# Patient Record
Sex: MALE | Race: WHITE | HISPANIC OR LATINO | Employment: FULL TIME | URBAN - METROPOLITAN AREA
[De-identification: names, ages, dates, MRNs, and addresses within clinical notes are randomized per-mention and may not be internally consistent; named-entity substitution may affect disease eponyms.]

---

## 2019-08-08 ENCOUNTER — TELEPHONE (OUTPATIENT)
Dept: SCHEDULING | Facility: IMAGING CENTER | Age: 59
End: 2019-08-08

## 2019-08-13 ENCOUNTER — HOSPITAL ENCOUNTER (OUTPATIENT)
Facility: MEDICAL CENTER | Age: 59
End: 2019-08-13
Attending: FAMILY MEDICINE
Payer: COMMERCIAL

## 2019-08-13 ENCOUNTER — OFFICE VISIT (OUTPATIENT)
Dept: MEDICAL GROUP | Facility: PHYSICIAN GROUP | Age: 59
End: 2019-08-13
Payer: COMMERCIAL

## 2019-08-13 VITALS
TEMPERATURE: 97.7 F | OXYGEN SATURATION: 99 % | DIASTOLIC BLOOD PRESSURE: 80 MMHG | BODY MASS INDEX: 22.88 KG/M2 | HEART RATE: 70 BPM | SYSTOLIC BLOOD PRESSURE: 124 MMHG | HEIGHT: 70 IN | WEIGHT: 159.83 LBS

## 2019-08-13 DIAGNOSIS — F90.0 ATTENTION DEFICIT HYPERACTIVITY DISORDER (ADHD), PREDOMINANTLY INATTENTIVE TYPE: ICD-10-CM

## 2019-08-13 DIAGNOSIS — Z12.11 SCREENING FOR COLON CANCER: ICD-10-CM

## 2019-08-13 DIAGNOSIS — Z12.5 PROSTATE CANCER SCREENING: ICD-10-CM

## 2019-08-13 DIAGNOSIS — R79.89 LOW TESTOSTERONE IN MALE: ICD-10-CM

## 2019-08-13 LAB
AMPHET UR QL SCN: NEGATIVE
BARBITURATES UR QL SCN: NEGATIVE
BENZODIAZ UR QL SCN: NEGATIVE
BZE UR QL SCN: NEGATIVE
CANNABINOIDS UR QL SCN: NEGATIVE
METHADONE UR QL SCN: NEGATIVE
OPIATES UR QL SCN: NEGATIVE
OXYCODONE UR QL SCN: NEGATIVE
PCP UR QL SCN: NEGATIVE
PROPOXYPH UR QL SCN: NEGATIVE

## 2019-08-13 PROCEDURE — 80307 DRUG TEST PRSMV CHEM ANLYZR: CPT

## 2019-08-13 PROCEDURE — 99204 OFFICE O/P NEW MOD 45 MIN: CPT | Performed by: FAMILY MEDICINE

## 2019-08-13 RX ORDER — TESTOSTERONE CYPIONATE 200 MG/ML
200 INJECTION, SOLUTION INTRAMUSCULAR ONCE
Qty: 1 ML | Refills: 0 | Status: SHIPPED | OUTPATIENT
Start: 2019-08-13 | End: 2019-08-13

## 2019-08-13 RX ORDER — METHYLPHENIDATE HYDROCHLORIDE 10 MG/1
TABLET ORAL
Qty: 60 TAB | Refills: 2 | Status: SHIPPED | OUTPATIENT
Start: 2019-08-13 | End: 2019-09-12 | Stop reason: SDUPTHER

## 2019-08-13 ASSESSMENT — PATIENT HEALTH QUESTIONNAIRE - PHQ9: CLINICAL INTERPRETATION OF PHQ2 SCORE: 0

## 2019-08-13 NOTE — ASSESSMENT & PLAN NOTE
Diagnosed years ago, has been on Ritalin 10 mg bid, ran out past Fall. Having trouble concentrating at his work. Would like to resume med.

## 2019-08-13 NOTE — PROGRESS NOTES
Complaint: Establish care, needs meds     Subjective:     Shashank Farmer is a 59 y.o. male here today to establish care.     Attention deficit hyperactivity disorder (ADHD), predominantly inattentive type  Diagnosed years ago, has been on Ritalin 10 mg bid, ran out past Fall. Having trouble concentrating at his work. Would like to resume med.    Low testosterone in male  Used to be on Depo-testosterone 200 mg/month. Self-injects.     ,  in [place].    Current medicines (including changes today)  Current Outpatient Medications   Medication Sig Dispense Refill   • methylphenidate (RITALIN) 10 MG Tab Take one tab daily in AM and one in early afternoon daily. 60 Tab 2   • testosterone cypionate (DEPO-TESTOSTERONE) 200 MG/ML Solution injection 1 mL by Intramuscular route Once for 1 dose. 1 mL 0     No current facility-administered medications for this visit.      He  has a past medical history of ADHD, Allergy, and Low testosterone in male.    Health Maintenance: needs colon cancer screening.      Allergies: Patient has no allergy information on record.    Current Outpatient Medications Ordered in Epic   Medication Sig Dispense Refill   • methylphenidate (RITALIN) 10 MG Tab Take one tab daily in AM and one in early afternoon daily. 60 Tab 2   • testosterone cypionate (DEPO-TESTOSTERONE) 200 MG/ML Solution injection 1 mL by Intramuscular route Once for 1 dose. 1 mL 0     No current Epic-ordered facility-administered medications on file.        Past Medical History:   Diagnosis Date   • ADHD    • Allergy    • Low testosterone in male        Past Surgical History:   Procedure Laterality Date   • APPENDECTOMY         Social History     Tobacco Use   • Smoking status: Former Smoker     Packs/day: 0.50     Years: 2.00     Pack years: 1.00     Types: Cigarettes     Start date: 1974     Last attempt to quit: 1976     Years since quittin.0   • Smokeless tobacco: Never Used   Substance Use  Topics   • Alcohol use: Not Currently   • Drug use: Never       Social History     Social History Narrative   • Not on file       Family History   Problem Relation Age of Onset   • Stroke Mother    • Psychiatric Illness Brother         suicide         ROS Positive for decreased attention span, low libido.  Patient denies any fever, chills, unintentional weight gain/loss, fatigue, stroke symptoms, dizziness, headache, nasal congestion, sore-throat, cough, heartburn, chest pain, difficulty breathing, abdominal discomfort, diarrhea/constipation, burning with urination or frequency, joint or back pain, skin rashes, depression or anxiety.       Objective:     /80 (BP Location: Right arm, Patient Position: Sitting, BP Cuff Size: Adult)   Pulse 70   Temp 36.5 °C (97.7 °F) (Temporal)   Wt 72.5 kg (159 lb 13.3 oz)   SpO2 99%  There is no height or weight on file to calculate BMI.   Physical Exam:  Constitutional: Alert, no distress.  Skin: Warm, dry, good turgor, no rashes in visible areas.  Eye: Equal, round and reactive, conjunctiva clear, lids normal.  ENMT: Lips without lesions, dentition in good repair, oropharynx clear.  Neck: Trachea midline, no masses, no thyromegaly. No cervical or supraclavicular lymphadenopathy  Respiratory: Unlabored respiratory effort, lungs clear to auscultation, no wheezes, no ronchi.  Cardiovascular: Normal S1, S2, no murmur, no extremity edema.  Abdomen: Soft, non-tender, no masses, no hepatosplenomegaly.  Psych: Alert and oriented x3, appropriate affect and mood.        Assessment and Plan:   The following treatment plan was discussed    1. Attention deficit hyperactivity disorder (ADHD), predominantly inattentive type  - Controlled Substance Treatment Agreement  - methylphenidate (RITALIN) 10 MG Tab; Take one tab daily in AM and one in early afternoon daily.  Dispense: 60 Tab; Refill: 0  - URINE DRUG SCREEN; Future    2. Low testosterone in male  Will notify with lab test  results  - CBC WITH DIFFERENTIAL; Future  - Comp Metabolic Panel; Future  - Lipid Profile; Future  - TESTOSTERONE SERUM; Future  - testosterone cypionate (DEPO-TESTOSTERONE) 200 MG/ML Solution injection; 1 mL by Intramuscular route Once for 1 dose.  Dispense: 1 mL; Refill: 0    3. Prostate cancer screening  Will notify with result  - PROSTATE SPECIFIC AG SCREENING; Future    4. Screening for colon cancer  Will notify with result  - OCCULT BLOOD FECES IMMUNOASSAY (FIT); Future    Followup: Return in about 4 weeks (around 9/10/2019), or in SS.    Please note that this dictation was created using voice recognition software. I have made every reasonable attempt to correct obvious errors, but I expect that there are errors of grammar and possibly content that I did not discover before finalizing the note.

## 2019-08-15 ENCOUNTER — HOSPITAL ENCOUNTER (OUTPATIENT)
Facility: MEDICAL CENTER | Age: 59
End: 2019-08-15
Attending: FAMILY MEDICINE
Payer: COMMERCIAL

## 2019-08-15 ENCOUNTER — HOSPITAL ENCOUNTER (OUTPATIENT)
Dept: LAB | Facility: MEDICAL CENTER | Age: 59
End: 2019-08-15
Attending: FAMILY MEDICINE
Payer: COMMERCIAL

## 2019-08-15 DIAGNOSIS — Z12.5 PROSTATE CANCER SCREENING: ICD-10-CM

## 2019-08-15 DIAGNOSIS — R79.89 LOW TESTOSTERONE IN MALE: ICD-10-CM

## 2019-08-15 LAB
ALBUMIN SERPL BCP-MCNC: 4.4 G/DL (ref 3.2–4.9)
ALBUMIN/GLOB SERPL: 1.3 G/DL
ALP SERPL-CCNC: 66 U/L (ref 30–99)
ALT SERPL-CCNC: 26 U/L (ref 2–50)
ANION GAP SERPL CALC-SCNC: 7 MMOL/L (ref 0–11.9)
AST SERPL-CCNC: 27 U/L (ref 12–45)
BASOPHILS # BLD AUTO: 0.9 % (ref 0–1.8)
BASOPHILS # BLD: 0.05 K/UL (ref 0–0.12)
BILIRUB SERPL-MCNC: 0.7 MG/DL (ref 0.1–1.5)
BUN SERPL-MCNC: 17 MG/DL (ref 8–22)
CALCIUM SERPL-MCNC: 9.5 MG/DL (ref 8.5–10.5)
CHLORIDE SERPL-SCNC: 105 MMOL/L (ref 96–112)
CHOLEST SERPL-MCNC: 233 MG/DL (ref 100–199)
CO2 SERPL-SCNC: 27 MMOL/L (ref 20–33)
CREAT SERPL-MCNC: 1.1 MG/DL (ref 0.5–1.4)
EOSINOPHIL # BLD AUTO: 0.08 K/UL (ref 0–0.51)
EOSINOPHIL NFR BLD: 1.4 % (ref 0–6.9)
ERYTHROCYTE [DISTWIDTH] IN BLOOD BY AUTOMATED COUNT: 43.6 FL (ref 35.9–50)
FASTING STATUS PATIENT QL REPORTED: NORMAL
GLOBULIN SER CALC-MCNC: 3.3 G/DL (ref 1.9–3.5)
GLUCOSE SERPL-MCNC: 89 MG/DL (ref 65–99)
HCT VFR BLD AUTO: 42.9 % (ref 42–52)
HDLC SERPL-MCNC: 41 MG/DL
HGB BLD-MCNC: 14.5 G/DL (ref 14–18)
IMM GRANULOCYTES # BLD AUTO: 0.04 K/UL (ref 0–0.11)
IMM GRANULOCYTES NFR BLD AUTO: 0.7 % (ref 0–0.9)
LDLC SERPL CALC-MCNC: 138 MG/DL
LYMPHOCYTES # BLD AUTO: 1.63 K/UL (ref 1–4.8)
LYMPHOCYTES NFR BLD: 27.9 % (ref 22–41)
MCH RBC QN AUTO: 30.3 PG (ref 27–33)
MCHC RBC AUTO-ENTMCNC: 33.8 G/DL (ref 33.7–35.3)
MCV RBC AUTO: 89.6 FL (ref 81.4–97.8)
MONOCYTES # BLD AUTO: 0.57 K/UL (ref 0–0.85)
MONOCYTES NFR BLD AUTO: 9.7 % (ref 0–13.4)
NEUTROPHILS # BLD AUTO: 3.48 K/UL (ref 1.82–7.42)
NEUTROPHILS NFR BLD: 59.4 % (ref 44–72)
NRBC # BLD AUTO: 0 K/UL
NRBC BLD-RTO: 0 /100 WBC
PLATELET # BLD AUTO: 214 K/UL (ref 164–446)
PMV BLD AUTO: 10.6 FL (ref 9–12.9)
POTASSIUM SERPL-SCNC: 4.8 MMOL/L (ref 3.6–5.5)
PROT SERPL-MCNC: 7.7 G/DL (ref 6–8.2)
PSA SERPL-MCNC: 1.37 NG/ML (ref 0–4)
RBC # BLD AUTO: 4.79 M/UL (ref 4.7–6.1)
SODIUM SERPL-SCNC: 139 MMOL/L (ref 135–145)
TESTOST SERPL-MCNC: 326 NG/DL (ref 175–781)
TRIGL SERPL-MCNC: 272 MG/DL (ref 0–149)
WBC # BLD AUTO: 5.9 K/UL (ref 4.8–10.8)

## 2019-08-15 PROCEDURE — 82274 ASSAY TEST FOR BLOOD FECAL: CPT

## 2019-08-15 PROCEDURE — 80053 COMPREHEN METABOLIC PANEL: CPT

## 2019-08-15 PROCEDURE — 85025 COMPLETE CBC W/AUTO DIFF WBC: CPT

## 2019-08-15 PROCEDURE — 84403 ASSAY OF TOTAL TESTOSTERONE: CPT

## 2019-08-15 PROCEDURE — 84153 ASSAY OF PSA TOTAL: CPT

## 2019-08-15 PROCEDURE — 36415 COLL VENOUS BLD VENIPUNCTURE: CPT

## 2019-08-15 PROCEDURE — 80061 LIPID PANEL: CPT

## 2019-08-16 DIAGNOSIS — Z12.11 SCREENING FOR COLON CANCER: ICD-10-CM

## 2019-08-17 LAB — HEMOCCULT STL QL IA: NEGATIVE

## 2019-08-20 ENCOUNTER — TELEPHONE (OUTPATIENT)
Dept: MEDICAL GROUP | Facility: PHYSICIAN GROUP | Age: 59
End: 2019-08-20

## 2019-08-20 NOTE — TELEPHONE ENCOUNTER
MEDICATION PRIOR AUTHORIZATION NEEDED:    1. Name of Medication: METHYLPHENIDATE    2. KEY: [id number] thru Cover my Meds    3. Requested By (Name of Pharmacy): [facility]      4. Is insurance on file current? YES    5. What is the name & phone number of the 3rd party payor? NA

## 2019-08-26 NOTE — TELEPHONE ENCOUNTER
FINAL PRIOR AUTHORIZATION STATUS:    1.  Name of Medication & Dose:METHYLPHENIDATE     2. Prior Auth Status: Approved through 8-20-19 THRU 8-9-20      AUTH # [id number]    3. Action Taken: Pharmacy Notified: yes Patient Notified: no

## 2019-09-12 ENCOUNTER — OFFICE VISIT (OUTPATIENT)
Dept: MEDICAL GROUP | Facility: PHYSICIAN GROUP | Age: 59
End: 2019-09-12
Payer: COMMERCIAL

## 2019-09-12 VITALS
WEIGHT: 158.51 LBS | DIASTOLIC BLOOD PRESSURE: 60 MMHG | RESPIRATION RATE: 12 BRPM | OXYGEN SATURATION: 99 % | BODY MASS INDEX: 22.74 KG/M2 | HEART RATE: 78 BPM | SYSTOLIC BLOOD PRESSURE: 116 MMHG | TEMPERATURE: 97.8 F

## 2019-09-12 DIAGNOSIS — F90.0 ATTENTION DEFICIT HYPERACTIVITY DISORDER (ADHD), PREDOMINANTLY INATTENTIVE TYPE: ICD-10-CM

## 2019-09-12 DIAGNOSIS — E78.2 MIXED HYPERLIPIDEMIA: ICD-10-CM

## 2019-09-12 DIAGNOSIS — M79.641 PAIN OF RIGHT HAND: ICD-10-CM

## 2019-09-12 DIAGNOSIS — Z12.11 SCREENING FOR COLON CANCER: ICD-10-CM

## 2019-09-12 DIAGNOSIS — G89.29 CHRONIC PAIN OF LEFT KNEE: ICD-10-CM

## 2019-09-12 DIAGNOSIS — M25.562 CHRONIC PAIN OF LEFT KNEE: ICD-10-CM

## 2019-09-12 PROCEDURE — 99214 OFFICE O/P EST MOD 30 MIN: CPT | Performed by: FAMILY MEDICINE

## 2019-09-12 RX ORDER — METHYLPHENIDATE HYDROCHLORIDE 10 MG/1
10 TABLET ORAL 2 TIMES DAILY
Qty: 60 TAB | Refills: 0 | Status: SHIPPED | OUTPATIENT
Start: 2019-11-21 | End: 2019-12-12 | Stop reason: SDUPTHER

## 2019-09-12 RX ORDER — METHYLPHENIDATE HYDROCHLORIDE 10 MG/1
10 TABLET ORAL 2 TIMES DAILY
Qty: 60 TAB | Refills: 0 | Status: SHIPPED | OUTPATIENT
Start: 2019-09-21 | End: 2019-09-12 | Stop reason: SDUPTHER

## 2019-09-12 RX ORDER — METHYLPHENIDATE HYDROCHLORIDE 10 MG/1
10 TABLET ORAL 2 TIMES DAILY
Qty: 60 TAB | Refills: 0 | Status: SHIPPED | OUTPATIENT
Start: 2019-10-21 | End: 2019-09-12 | Stop reason: SDUPTHER

## 2019-09-12 NOTE — ASSESSMENT & PLAN NOTE
Recent FLP:  Results for JARED ALMARAZ (MRN [id number]) as of 9/12/2019 08:49   Ref. Range 8/15/2019 08:51   Cholesterol,Tot Latest Ref Range: 100 - 199 mg/dL 233 (H)   Triglycerides Latest Ref Range: 0 - 149 mg/dL 272 (H)   HDL Latest Ref Range: >=40 mg/dL 41   LDL Latest Ref Range: <100 mg/dL 138 (H)     Although 10 yr CV risk estimated at 16%, does not want to go on meds. Will take otc omega 3.

## 2019-09-12 NOTE — ASSESSMENT & PLAN NOTE
Tweaked left knee a couple weeks ago, now hurts and swells occasionally. Does not give way. No taking any pain meds.

## 2019-09-12 NOTE — PROGRESS NOTES
Complaint: f/u, review labs, referral, new ortho issues     Subjective:     Jared Almaraz is a 59 y.o. male here today for f/u    I reviewed lab test results with patient. CBC, CMP, PSA, testosterone levels wnl.    Mixed hyperlipidemia  Recent FLP:  Results for JARED ALMARAZ (MRN [id number]) as of 9/12/2019 08:49   Ref. Range 8/15/2019 08:51   Cholesterol,Tot Latest Ref Range: 100 - 199 mg/dL 233 (H)   Triglycerides Latest Ref Range: 0 - 149 mg/dL 272 (H)   HDL Latest Ref Range: >=40 mg/dL 41   LDL Latest Ref Range: <100 mg/dL 138 (H)     Although 10 yr CV risk estimated at 16%, does not want to go on meds. Will take otc omega 3.      Low testosterone in male  Recent testosterone level was 326, not needing intervention.    Attention deficit hyperactivity disorder (ADHD), predominantly inattentive type  Doing well on current dosage of ritalin. Needs refills. UDS positive for pot.    Chronic pain of left knee  Tweaked left knee a couple weeks ago, now hurts and swells occasionally. Does not give way. No taking any pain meds.    Pain of right hand  Has noticed painful bump on back of right hand, causes discomfort when he opens doors, grasps. Denies injury.     No other concerns or complaints today.    Current medicines (including changes today)  Current Outpatient Medications   Medication Sig Dispense Refill   • [START ON 11/21/2019] methylphenidate (RITALIN) 10 MG Tab Take 1 Tab by mouth 2 times a day for 30 days. Take one tab daily in AM and one in early afternoon daily. 60 Tab 0     No current facility-administered medications for this visit.      He  has a past medical history of ADHD, Allergy, and Low testosterone in male.    Health Maintenance: referral for colonoscopy      Allergies: Patient has no allergy information on record.    Current Outpatient Medications Ordered in Epic   Medication Sig Dispense Refill   • [START ON 11/21/2019] methylphenidate (RITALIN) 10 MG Tab Take 1 Tab by mouth 2 times a day  for 30 days. Take one tab daily in AM and one in early afternoon daily. 60 Tab 0     No current Epic-ordered facility-administered medications on file.        Past Medical History:   Diagnosis Date   • ADHD    • Allergy    • Low testosterone in male        Past Surgical History:   Procedure Laterality Date   • APPENDECTOMY         Social History     Tobacco Use   • Smoking status: Former Smoker     Packs/day: 0.50     Years: 2.00     Pack years: 1.00     Types: Cigarettes     Start date: 1974     Last attempt to quit: 1976     Years since quittin.1   • Smokeless tobacco: Never Used   Substance Use Topics   • Alcohol use: Not Currently   • Drug use: Never       Social History     Social History Narrative   • Not on file       Family History   Problem Relation Age of Onset   • Stroke Mother    • Psychiatric Illness Brother         suicide         ROS Positive for pain in left knee and right hand.  Patient denies any fever, chills, unintentional weight gain/loss, fatigue, stroke symptoms, dizziness, headache, nasal congestion, sore-throat, cough, heartburn, chest pain, difficulty breathing, abdominal discomfort, diarrhea/constipation, burning with urination or frequency, joint or back pain, skin rashes, depression or anxiety.       Objective:     /60   Pulse 78   Temp 36.6 °C (97.8 °F)   Resp 12   Wt 71.9 kg (158 lb 8.2 oz)   SpO2 99%  Body mass index is 22.74 kg/m².   Physical Exam:  Constitutional: Alert, no distress.  Right hand: bony prominence of base metacarpal 1, tender upon palpation.  Left knee: no deformity, no effusion, tender along emilee-medial shelf, some crepitus noticed upon flex/ext. No laxity noted.      Assessment and Plan:   The following treatment plan was discussed    1. Attention deficit hyperactivity disorder (ADHD), predominantly inattentive type  Controlled on meds.  - methylphenidate (RITALIN) 10 MG Tab; Take 1 Tab by mouth 2 times a day for 30 days. Take one tab  daily in AM and one in early afternoon daily.  Dispense: 60 Tab; Refill: 2 dated    2. Mixed hyperlipidemia  Recheck FLP 1 year.    3. Screening for colon cancer  - REFERRAL TO GI FOR COLONOSCOPY    4. Chronic pain of left knee  Observe, otc nsaids prn. May need XRs and referral if sxs persist or worsen.    5. Pain of right hand  Will notify with result, referral if needed.   - DX-HAND 3+ RIGHT; Future        Followup: Return in about 3 months (around 12/12/2019).    Please note that this dictation was created using voice recognition software. I have made every reasonable attempt to correct obvious errors, but I expect that there are errors of grammar and possibly content that I did not discover before finalizing the note.

## 2019-09-17 ENCOUNTER — APPOINTMENT (OUTPATIENT)
Dept: RADIOLOGY | Facility: IMAGING CENTER | Age: 59
End: 2019-09-17
Attending: FAMILY MEDICINE
Payer: COMMERCIAL

## 2019-09-17 ENCOUNTER — APPOINTMENT (OUTPATIENT)
Dept: URGENT CARE | Facility: PHYSICIAN GROUP | Age: 59
End: 2019-09-17
Payer: COMMERCIAL

## 2019-09-17 DIAGNOSIS — K52.9 CHRONIC DIARRHEA: ICD-10-CM

## 2019-09-17 DIAGNOSIS — M79.641 PAIN OF RIGHT HAND: ICD-10-CM

## 2019-09-17 PROCEDURE — 73130 X-RAY EXAM OF HAND: CPT | Mod: TC,FY,RT | Performed by: FAMILY MEDICINE

## 2019-09-18 ENCOUNTER — HOSPITAL ENCOUNTER (OUTPATIENT)
Facility: MEDICAL CENTER | Age: 59
End: 2019-09-18
Attending: FAMILY MEDICINE
Payer: COMMERCIAL

## 2019-09-18 DIAGNOSIS — K52.9 CHRONIC DIARRHEA: ICD-10-CM

## 2019-09-18 LAB
H PYLORI AG STL QL IA: DETECTED
WBC STL QL MICRO: NORMAL

## 2019-09-18 PROCEDURE — 87209 SMEAR COMPLEX STAIN: CPT

## 2019-09-18 PROCEDURE — 87177 OVA AND PARASITES SMEARS: CPT

## 2019-09-18 PROCEDURE — 87045 FECES CULTURE AEROBIC BACT: CPT

## 2019-09-18 PROCEDURE — 87899 AGENT NOS ASSAY W/OPTIC: CPT

## 2019-09-18 PROCEDURE — 87046 STOOL CULTR AEROBIC BACT EA: CPT

## 2019-09-18 PROCEDURE — 89055 LEUKOCYTE ASSESSMENT FECAL: CPT

## 2019-09-18 PROCEDURE — 87338 HPYLORI STOOL AG IA: CPT

## 2019-09-19 ENCOUNTER — TELEPHONE (OUTPATIENT)
Dept: MEDICAL GROUP | Facility: PHYSICIAN GROUP | Age: 59
End: 2019-09-19

## 2019-09-19 DIAGNOSIS — A04.8 H. PYLORI INFECTION: ICD-10-CM

## 2019-09-19 LAB
E COLI SXT1+2 STL IA: NORMAL
SIGNIFICANT IND 70042: NORMAL
SITE SITE: NORMAL
SOURCE SOURCE: NORMAL

## 2019-09-19 RX ORDER — OMEPRAZOLE 20 MG/1
20 CAPSULE, DELAYED RELEASE ORAL 2 TIMES DAILY
Qty: 60 CAP | Refills: 0 | Status: SHIPPED | OUTPATIENT
Start: 2019-09-19 | End: 2020-06-09

## 2019-09-19 RX ORDER — AMOXICILLIN 500 MG/1
1000 CAPSULE ORAL 2 TIMES DAILY
Qty: 56 CAP | Refills: 0 | Status: SHIPPED | OUTPATIENT
Start: 2019-09-19 | End: 2019-12-12

## 2019-09-19 RX ORDER — CLARITHROMYCIN 500 MG/1
500 TABLET, COATED ORAL 2 TIMES DAILY
Qty: 28 TAB | Refills: 0 | Status: SHIPPED | OUTPATIENT
Start: 2019-09-19 | End: 2019-10-03

## 2019-09-19 NOTE — TELEPHONE ENCOUNTER
Lab called to report positive H. Pylori results in Stool culture. Negative on WBC in stool culture.

## 2019-09-21 LAB
BACTERIA STL CULT: NORMAL
E COLI SXT1+2 STL IA: NORMAL
SIGNIFICANT IND 70042: NORMAL
SITE SITE: NORMAL
SOURCE SOURCE: NORMAL

## 2019-09-23 ENCOUNTER — TELEPHONE (OUTPATIENT)
Dept: URGENT CARE | Facility: PHYSICIAN GROUP | Age: 59
End: 2019-09-23

## 2019-09-23 ENCOUNTER — TELEPHONE (OUTPATIENT)
Dept: MEDICAL GROUP | Facility: PHYSICIAN GROUP | Age: 59
End: 2019-09-23

## 2019-09-23 NOTE — TELEPHONE ENCOUNTER
Lab called [facility] for a positive result:    Contains abnormal data H.PYLORI STOOL ANTIGEN   Order: [id number]   Status:  Final result   Visible to patient:  Yes (MyChart) Next appt:  None Dx:  Chronic diarrhea    Ref Range & Units 5d ago   H Pylori Ag Stool E Not Detected DETECTEDAbnormal     Resulting Agency  M      Narrative   Performed by: M   Patient weight (in lbs)->0  Has the patient been out of the country recently?->Yes  If yes, please indicate where, when, and duration.->[place]  Is the patient immuno-compromised?->No           Result forwarded to PCP/ ordering provider

## 2019-09-23 NOTE — TELEPHONE ENCOUNTER
[facility] called stating that they had received a positive test result for this patient.     They are requesting a call back at [phone] option 4

## 2019-09-24 LAB
O+P STL MICRO: NEGATIVE
O+P STL TRI STN: NEGATIVE

## 2019-10-28 ENCOUNTER — TELEPHONE (OUTPATIENT)
Dept: MEDICAL GROUP | Facility: CLINIC | Age: 59
End: 2019-10-28

## 2019-10-31 ENCOUNTER — OFFICE VISIT (OUTPATIENT)
Dept: MEDICAL GROUP | Facility: PHYSICIAN GROUP | Age: 59
End: 2019-10-31
Payer: COMMERCIAL

## 2019-10-31 ENCOUNTER — HOSPITAL ENCOUNTER (OUTPATIENT)
Dept: LAB | Facility: MEDICAL CENTER | Age: 59
End: 2019-10-31
Attending: FAMILY MEDICINE
Payer: COMMERCIAL

## 2019-10-31 VITALS
BODY MASS INDEX: 22.81 KG/M2 | OXYGEN SATURATION: 96 % | SYSTOLIC BLOOD PRESSURE: 108 MMHG | RESPIRATION RATE: 16 BRPM | DIASTOLIC BLOOD PRESSURE: 66 MMHG | HEART RATE: 84 BPM | WEIGHT: 158.95 LBS | TEMPERATURE: 98 F

## 2019-10-31 DIAGNOSIS — Z72.51 HIGH RISK HETEROSEXUAL BEHAVIOR: ICD-10-CM

## 2019-10-31 LAB
HCV AB SER QL: NEGATIVE
HIV 1+2 AB+HIV1 P24 AG SERPL QL IA: NON REACTIVE
TREPONEMA PALLIDUM IGG+IGM AB [PRESENCE] IN SERUM OR PLASMA BY IMMUNOASSAY: NON REACTIVE

## 2019-10-31 PROCEDURE — 36415 COLL VENOUS BLD VENIPUNCTURE: CPT

## 2019-10-31 PROCEDURE — 86780 TREPONEMA PALLIDUM: CPT

## 2019-10-31 PROCEDURE — 99213 OFFICE O/P EST LOW 20 MIN: CPT | Performed by: FAMILY MEDICINE

## 2019-10-31 PROCEDURE — 87389 HIV-1 AG W/HIV-1&-2 AB AG IA: CPT

## 2019-10-31 PROCEDURE — 86803 HEPATITIS C AB TEST: CPT

## 2019-10-31 PROCEDURE — 87491 CHLMYD TRACH DNA AMP PROBE: CPT

## 2019-10-31 PROCEDURE — 87591 N.GONORRHOEAE DNA AMP PROB: CPT

## 2019-11-01 LAB
C TRACH DNA SPEC QL NAA+PROBE: NEGATIVE
N GONORRHOEA DNA SPEC QL NAA+PROBE: NEGATIVE
SPECIMEN SOURCE: NORMAL

## 2019-11-01 NOTE — ASSESSMENT & PLAN NOTE
Would like to get tested for STDs. Had risky unprotected sex decades ago, but wife insisted he get tested now due to his H.pylori infection.

## 2019-11-01 NOTE — PROGRESS NOTES
Complaint: Wants testing done     Subjective:     Shashank Farmer is a 59 y.o. male here today for f/u of his chronic diarrhea treatment as well as new issue.    H. pylori infection  Completed treatment, no more sxs. Told patient to consider himself cured.    High risk heterosexual behavior  Would like to get tested for STDs. Had risky unprotected sex decades ago, but wife insisted he get tested now due to his H.pylori infection. No urinary sxs.     No other concerns or complaints today.    Current medicines (including changes today)  Current Outpatient Medications   Medication Sig Dispense Refill   • omeprazole (PRILOSEC) 20 MG delayed-release capsule Take 1 Cap by mouth 2 times a day. 60 Cap 0   • [START ON 11/21/2019] methylphenidate (RITALIN) 10 MG Tab Take 1 Tab by mouth 2 times a day for 30 days. Take one tab daily in AM and one in early afternoon daily. 60 Tab 0   • amoxicillin (AMOXIL) 500 MG Cap Take 2 Caps by mouth 2 times a day. 56 Cap 0     No current facility-administered medications for this visit.      He  has a past medical history of ADHD, Allergy, and Low testosterone in male.    Health Maintenance:       Allergies: Patient has no known allergies.    Current Outpatient Medications Ordered in Epic   Medication Sig Dispense Refill   • omeprazole (PRILOSEC) 20 MG delayed-release capsule Take 1 Cap by mouth 2 times a day. 60 Cap 0   • [START ON 11/21/2019] methylphenidate (RITALIN) 10 MG Tab Take 1 Tab by mouth 2 times a day for 30 days. Take one tab daily in AM and one in early afternoon daily. 60 Tab 0   • amoxicillin (AMOXIL) 500 MG Cap Take 2 Caps by mouth 2 times a day. 56 Cap 0     No current Epic-ordered facility-administered medications on file.        Past Medical History:   Diagnosis Date   • ADHD    • Allergy    • Low testosterone in male        Past Surgical History:   Procedure Laterality Date   • APPENDECTOMY         Social History     Tobacco Use   • Smoking status: Former Smoker      Packs/day: 0.50     Years: 2.00     Pack years: 1.00     Types: Cigarettes     Start date: 1974     Last attempt to quit: 1976     Years since quittin.2   • Smokeless tobacco: Never Used   Substance Use Topics   • Alcohol use: Not Currently   • Drug use: Never       Social History     Social History Narrative   • Not on file       Family History   Problem Relation Age of Onset   • Stroke Mother    • Psychiatric Illness Brother         suicide         ROS   Patient denies any fever, chills, unintentional weight gain/loss, fatigue, stroke symptoms, dizziness, headache, nasal congestion, sore-throat, cough, heartburn, chest pain, difficulty breathing, abdominal discomfort, diarrhea/constipation, burning with urination or frequency, joint or back pain, skin rashes, depression or anxiety.       Objective:     /66   Pulse 84   Temp 36.7 °C (98 °F)   Resp 16   Wt 72.1 kg (158 lb 15.2 oz)   SpO2 96%  Body mass index is 22.81 kg/m².   Physical Exam:  Constitutional: Alert, no distress.  No formal exam      Assessment and Plan:   The following treatment plan was discussed    1. High risk heterosexual behavior  Will notify with results.  - HEP C VIRUS ANTIBODY; Future  - HEPATITIS B SURFACE ANTIGEN; Future  - Chlamydia/GC PCR Urine Or Swab; Future  - HIV AG/AB COMBO ASSAY SCREENING; Future      Followup: Return for refills.    Please note that this dictation was created using voice recognition software. I have made every reasonable attempt to correct obvious errors, but I expect that there are errors of grammar and possibly content that I did not discover before finalizing the note.

## 2019-12-12 ENCOUNTER — OFFICE VISIT (OUTPATIENT)
Dept: MEDICAL GROUP | Facility: PHYSICIAN GROUP | Age: 59
End: 2019-12-12
Payer: COMMERCIAL

## 2019-12-12 VITALS
DIASTOLIC BLOOD PRESSURE: 82 MMHG | HEART RATE: 86 BPM | HEIGHT: 70 IN | TEMPERATURE: 98.7 F | BODY MASS INDEX: 22.09 KG/M2 | WEIGHT: 154.32 LBS | RESPIRATION RATE: 12 BRPM | OXYGEN SATURATION: 98 % | SYSTOLIC BLOOD PRESSURE: 122 MMHG

## 2019-12-12 DIAGNOSIS — F90.0 ATTENTION DEFICIT HYPERACTIVITY DISORDER (ADHD), PREDOMINANTLY INATTENTIVE TYPE: ICD-10-CM

## 2019-12-12 PROBLEM — Z72.51 HIGH RISK HETEROSEXUAL BEHAVIOR: Status: RESOLVED | Noted: 2019-10-31 | Resolved: 2019-12-12

## 2019-12-12 PROBLEM — A04.8 H. PYLORI INFECTION: Status: RESOLVED | Noted: 2019-09-19 | Resolved: 2019-12-12

## 2019-12-12 PROBLEM — M79.641 PAIN OF RIGHT HAND: Status: RESOLVED | Noted: 2019-09-12 | Resolved: 2019-12-12

## 2019-12-12 PROCEDURE — 99212 OFFICE O/P EST SF 10 MIN: CPT | Performed by: FAMILY MEDICINE

## 2019-12-12 RX ORDER — METHYLPHENIDATE HYDROCHLORIDE 10 MG/1
TABLET ORAL
Qty: 60 TAB | Refills: 0 | Status: SHIPPED | OUTPATIENT
Start: 2020-02-03 | End: 2019-12-12 | Stop reason: SDUPTHER

## 2019-12-12 RX ORDER — METHYLPHENIDATE HYDROCHLORIDE 10 MG/1
TABLET ORAL
Qty: 60 TAB | Refills: 0 | Status: SHIPPED | OUTPATIENT
Start: 2020-03-03 | End: 2019-12-12 | Stop reason: SDUPTHER

## 2019-12-12 RX ORDER — METHYLPHENIDATE HYDROCHLORIDE 10 MG/1
TABLET ORAL
Qty: 60 TAB | Refills: 0 | Status: SHIPPED | OUTPATIENT
Start: 2020-03-03 | End: 2020-03-31 | Stop reason: SDUPTHER

## 2019-12-12 RX ORDER — METHYLPHENIDATE HYDROCHLORIDE 10 MG/1
TABLET ORAL
Qty: 60 TAB | Refills: 0 | Status: SHIPPED | OUTPATIENT
Start: 2020-01-03 | End: 2019-12-12 | Stop reason: SDUPTHER

## 2019-12-12 NOTE — PROGRESS NOTES
Complaint: Med refill     Subjective:     Shashank Farmer is a 59 y.o. male here today for med refill.     Attention deficit hyperactivity disorder (ADHD), predominantly inattentive type  Doing well on his Ritalin. Due for refill beginning . No concerns.     No other concerns or complaints today.    Current medicines (including changes today)  Current Outpatient Medications   Medication Sig Dispense Refill   • [START ON 3/3/2020] methylphenidate (RITALIN) 10 MG Tab Take one tab daily in AM and one in early afternoon daily. 60 Tab 0   • omeprazole (PRILOSEC) 20 MG delayed-release capsule Take 1 Cap by mouth 2 times a day. 60 Cap 0     No current facility-administered medications for this visit.      He  has a past medical history of ADHD, Allergy, H. pylori infection, and Low testosterone in male.    Health Maintenance: declines vaccinations      Allergies: Patient has no known allergies.    Current Outpatient Medications Ordered in Epic   Medication Sig Dispense Refill   • [START ON 3/3/2020] methylphenidate (RITALIN) 10 MG Tab Take one tab daily in AM and one in early afternoon daily. 60 Tab 0   • omeprazole (PRILOSEC) 20 MG delayed-release capsule Take 1 Cap by mouth 2 times a day. 60 Cap 0     No current Epic-ordered facility-administered medications on file.        Past Medical History:   Diagnosis Date   • ADHD    • Allergy    • H. pylori infection    • Low testosterone in male        Past Surgical History:   Procedure Laterality Date   • APPENDECTOMY         Social History     Tobacco Use   • Smoking status: Former Smoker     Packs/day: 0.50     Years: 2.00     Pack years: 1.00     Types: Cigarettes     Start date: 1974     Last attempt to quit: 1976     Years since quittin.3   • Smokeless tobacco: Never Used   Substance Use Topics   • Alcohol use: Not Currently   • Drug use: Never       Social History     Patient does not qualify to have social determinant information on file (likely too  "young).   Social History Narrative   • Not on file       Family History   Problem Relation Age of Onset   • Stroke Mother    • Psychiatric Illness Brother         suicide         ROS   Patient denies any fever, chills, unintentional weight gain/loss, fatigue, stroke symptoms, dizziness, headache, nasal congestion, sore-throat, cough, heartburn, chest pain, difficulty breathing, abdominal discomfort, diarrhea/constipation, burning with urination or frequency, joint or back pain, skin rashes, depression or anxiety.       Objective:     /82   Pulse 86   Temp 37.1 °C (98.7 °F) (Temporal)   Resp 12   Ht 1.778 m (5' 10\")   Wt 70 kg (154 lb 5.2 oz)   SpO2 98%  Body mass index is 22.14 kg/m².   Physical Exam:  Constitutional: Alert, no distress.  Psych: Alert and oriented x3, appropriate affect and mood.        Assessment and Plan:   The following treatment plan was discussed    1. Attention deficit hyperactivity disorder (ADHD), predominantly inattentive type  Pedrito queried, due for refill 1/3/20.  - methylphenidate (RITALIN) 10 MG Tab; Take one tab daily in AM and one in early afternoon daily.  Dispense: 60 Tab; Refill: 2 dated      Followup: Return in about 3 months (around 3/12/2020), or if symptoms worsen or fail to improve.    Please note that this dictation was created using voice recognition software. I have made every reasonable attempt to correct obvious errors, but I expect that there are errors of grammar and possibly content that I did not discover before finalizing the note.           "

## 2020-03-31 ENCOUNTER — OFFICE VISIT (OUTPATIENT)
Dept: MEDICAL GROUP | Facility: PHYSICIAN GROUP | Age: 60
End: 2020-03-31
Payer: COMMERCIAL

## 2020-03-31 VITALS
OXYGEN SATURATION: 98 % | DIASTOLIC BLOOD PRESSURE: 80 MMHG | BODY MASS INDEX: 21.78 KG/M2 | TEMPERATURE: 98 F | HEART RATE: 76 BPM | SYSTOLIC BLOOD PRESSURE: 130 MMHG | WEIGHT: 152.12 LBS | RESPIRATION RATE: 16 BRPM | HEIGHT: 70 IN

## 2020-03-31 DIAGNOSIS — F90.0 ATTENTION DEFICIT HYPERACTIVITY DISORDER (ADHD), PREDOMINANTLY INATTENTIVE TYPE: ICD-10-CM

## 2020-03-31 DIAGNOSIS — R10.9 ABDOMINAL DISCOMFORT IN LEFT FLANK: ICD-10-CM

## 2020-03-31 DIAGNOSIS — Z01.812 PRE-PROCEDURE LAB EXAM: ICD-10-CM

## 2020-03-31 PROCEDURE — 99214 OFFICE O/P EST MOD 30 MIN: CPT | Performed by: FAMILY MEDICINE

## 2020-03-31 RX ORDER — METHYLPHENIDATE HYDROCHLORIDE 10 MG/1
TABLET ORAL
Qty: 60 TAB | Refills: 0 | Status: SHIPPED | OUTPATIENT
Start: 2020-06-02 | End: 2020-06-09 | Stop reason: SDUPTHER

## 2020-03-31 RX ORDER — METHYLPHENIDATE HYDROCHLORIDE 10 MG/1
TABLET ORAL
Qty: 60 TAB | Refills: 0 | Status: SHIPPED | OUTPATIENT
Start: 2020-05-02 | End: 2020-03-31 | Stop reason: SDUPTHER

## 2020-03-31 RX ORDER — METHYLPHENIDATE HYDROCHLORIDE 10 MG/1
TABLET ORAL
Qty: 60 TAB | Refills: 0 | Status: SHIPPED | OUTPATIENT
Start: 2020-04-02 | End: 2020-03-31 | Stop reason: SDUPTHER

## 2020-03-31 ASSESSMENT — FIBROSIS 4 INDEX: FIB4 SCORE: 1.53

## 2020-03-31 NOTE — PROGRESS NOTES
Complaint: refill Ritalin, tummy issues.     Subjective:     Shashank Farmer is a 60 y.o. male here today for f/u. Doing well. Working from home.    Attention deficit hyperactivity disorder (ADHD), predominantly inattentive type  Doing well on current dose of Ritalin, refill due in 2 days.    Abdominal discomfort in left flank  Complaining of more bloating, discomfort in abdomen, mainly down left flank. Regular soft BMs, no blood in stool. Not food related. No weight loss. Las colonoscopy 5 years ago, wnl.     No other concerns or complaints today.    Current medicines (including changes today)  Current Outpatient Medications   Medication Sig Dispense Refill   • [START ON 6/2/2020] methylphenidate (RITALIN) 10 MG Tab Take one tab daily in AM and one in early afternoon daily. 60 Tab 0   • omeprazole (PRILOSEC) 20 MG delayed-release capsule Take 1 Cap by mouth 2 times a day. (Patient not taking: Reported on 3/31/2020) 60 Cap 0     No current facility-administered medications for this visit.      He  has a past medical history of ADHD, Allergy, H. pylori infection, and Low testosterone in male.    Health Maintenance:       Allergies: Patient has no known allergies.    Current Outpatient Medications Ordered in Epic   Medication Sig Dispense Refill   • [START ON 6/2/2020] methylphenidate (RITALIN) 10 MG Tab Take one tab daily in AM and one in early afternoon daily. 60 Tab 0   • omeprazole (PRILOSEC) 20 MG delayed-release capsule Take 1 Cap by mouth 2 times a day. (Patient not taking: Reported on 3/31/2020) 60 Cap 0     No current Epic-ordered facility-administered medications on file.        Past Medical History:   Diagnosis Date   • ADHD    • Allergy    • H. pylori infection    • Low testosterone in male        Past Surgical History:   Procedure Laterality Date   • APPENDECTOMY         Social History     Tobacco Use   • Smoking status: Former Smoker     Packs/day: 0.50     Years: 2.00     Pack years: 1.00     Types:  "Cigarettes     Start date: 1974     Last attempt to quit: 1976     Years since quittin.6   • Smokeless tobacco: Never Used   Substance Use Topics   • Alcohol use: Not Currently   • Drug use: Never       Social History     Social History Narrative   • Not on file       Family History   Problem Relation Age of Onset   • Stroke Mother    • Psychiatric Illness Brother         suicide         ROS Positive for abdominal discomfort, bloating.  Patient denies any fever, chills, unintentional weight gain/loss, fatigue, stroke symptoms, dizziness, headache, nasal congestion, sore-throat, cough, heartburn, chest pain, difficulty breathing, diarrhea/constipation, burning with urination or frequency, joint or back pain, skin rashes, depression or anxiety.       Objective:     /80 (BP Location: Left arm, Patient Position: Sitting, BP Cuff Size: Adult)   Pulse 76   Temp 36.7 °C (98 °F) (Temporal)   Resp 16   Ht 1.778 m (5' 10\")   Wt 69 kg (152 lb 1.9 oz)   SpO2 98%  Body mass index is 21.83 kg/m².   Physical Exam:  Constitutional: Alert, no distress.  Respiratory: Unlabored respiratory effort, lungs clear to auscultation, no wheezes, no ronchi.  Cardiovascular: Normal S1, S2, no murmur, no extremity edema.  Abdomen: Soft, non-tender, no masses, no hepatosplenomegaly.  Psych: Alert and oriented x3, appropriate affect and mood.        Assessment and Plan:   The following treatment plan was discussed    1. Attention deficit hyperactivity disorder (ADHD), predominantly inattentive type  Controlled on meds.  - methylphenidate (RITALIN) 10 MG Tab; Take one tab daily in AM and one in early afternoon daily.  Dispense: 60 Tab; Refill: 2 dated    2. Abdominal discomfort in left flank  Probably diverticular disease. Will notify with result. Patient to call to set up.   - CT-ABDOMEN WITH & W/O, PELVIS WITH; Future    3. Pre-procedure lab exam  - Comp Metabolic Panel; Future      Followup: Return in about 3 months " (around 6/30/2020) for refill Ritalin.    Please note that this dictation was created using voice recognition software. I have made every reasonable attempt to correct obvious errors, but I expect that there are errors of grammar and possibly content that I did not discover before finalizing the note.

## 2020-03-31 NOTE — ASSESSMENT & PLAN NOTE
Complaining of more bloating, discomfort in abdomen, mainly down left flank. Regular soft BMs, no blood in stool. Not food related. No weight loss. Las colonoscopy 5 years ago, r[place].

## 2020-04-01 ENCOUNTER — HOSPITAL ENCOUNTER (OUTPATIENT)
Dept: LAB | Facility: MEDICAL CENTER | Age: 60
End: 2020-04-01
Attending: FAMILY MEDICINE
Payer: COMMERCIAL

## 2020-04-01 DIAGNOSIS — Z01.812 PRE-PROCEDURE LAB EXAM: ICD-10-CM

## 2020-04-01 LAB
ALBUMIN SERPL BCP-MCNC: 4.4 G/DL (ref 3.2–4.9)
ALBUMIN/GLOB SERPL: 1.5 G/DL
ALP SERPL-CCNC: 79 U/L (ref 30–99)
ALT SERPL-CCNC: 24 U/L (ref 2–50)
ANION GAP SERPL CALC-SCNC: 14 MMOL/L (ref 7–16)
AST SERPL-CCNC: 28 U/L (ref 12–45)
BILIRUB SERPL-MCNC: 0.5 MG/DL (ref 0.1–1.5)
BUN SERPL-MCNC: 16 MG/DL (ref 8–22)
CALCIUM SERPL-MCNC: 9.1 MG/DL (ref 8.5–10.5)
CHLORIDE SERPL-SCNC: 104 MMOL/L (ref 96–112)
CO2 SERPL-SCNC: 23 MMOL/L (ref 20–33)
CREAT SERPL-MCNC: 1.04 MG/DL (ref 0.5–1.4)
GLOBULIN SER CALC-MCNC: 2.9 G/DL (ref 1.9–3.5)
GLUCOSE SERPL-MCNC: 160 MG/DL (ref 65–99)
POTASSIUM SERPL-SCNC: 3.9 MMOL/L (ref 3.6–5.5)
PROT SERPL-MCNC: 7.3 G/DL (ref 6–8.2)
SODIUM SERPL-SCNC: 141 MMOL/L (ref 135–145)

## 2020-04-01 PROCEDURE — 36415 COLL VENOUS BLD VENIPUNCTURE: CPT

## 2020-04-01 PROCEDURE — 80053 COMPREHEN METABOLIC PANEL: CPT

## 2020-04-08 ENCOUNTER — HOSPITAL ENCOUNTER (OUTPATIENT)
Dept: RADIOLOGY | Facility: MEDICAL CENTER | Age: 60
End: 2020-04-08
Attending: FAMILY MEDICINE
Payer: COMMERCIAL

## 2020-04-08 ENCOUNTER — TELEPHONE (OUTPATIENT)
Dept: MEDICAL GROUP | Facility: PHYSICIAN GROUP | Age: 60
End: 2020-04-08

## 2020-04-08 DIAGNOSIS — R10.9 ABDOMINAL DISCOMFORT IN LEFT FLANK: ICD-10-CM

## 2020-04-08 PROCEDURE — 700117 HCHG RX CONTRAST REV CODE 255: Performed by: FAMILY MEDICINE

## 2020-04-08 PROCEDURE — 74177 CT ABD & PELVIS W/CONTRAST: CPT

## 2020-04-08 RX ADMIN — IOHEXOL 25 ML: 240 INJECTION, SOLUTION INTRATHECAL; INTRAVASCULAR; INTRAVENOUS; ORAL at 10:47

## 2020-04-08 RX ADMIN — IOHEXOL 100 ML: 350 INJECTION, SOLUTION INTRAVENOUS at 10:47

## 2020-04-08 NOTE — TELEPHONE ENCOUNTER
Freda from [facility] on [address] called to clarify order for the CT Abdomen. She asked what are we looking for or ruling out I informed in the notes it states for probably Diverticular disease. She stated that without contrast isn't needed then that only with is ok. She needs clarification if ok to do just with contrast? Her ext. [id number] and patient's appt. Is at 10:30am this morning

## 2020-06-09 ENCOUNTER — OFFICE VISIT (OUTPATIENT)
Dept: MEDICAL GROUP | Facility: PHYSICIAN GROUP | Age: 60
End: 2020-06-09
Payer: COMMERCIAL

## 2020-06-09 VITALS
TEMPERATURE: 97 F | SYSTOLIC BLOOD PRESSURE: 118 MMHG | HEART RATE: 65 BPM | OXYGEN SATURATION: 99 % | RESPIRATION RATE: 16 BRPM | WEIGHT: 153.66 LBS | DIASTOLIC BLOOD PRESSURE: 76 MMHG | BODY MASS INDEX: 21.51 KG/M2 | HEIGHT: 71 IN

## 2020-06-09 DIAGNOSIS — R10.9 ABDOMINAL DISCOMFORT IN LEFT FLANK: ICD-10-CM

## 2020-06-09 DIAGNOSIS — F90.0 ATTENTION DEFICIT HYPERACTIVITY DISORDER (ADHD), PREDOMINANTLY INATTENTIVE TYPE: ICD-10-CM

## 2020-06-09 DIAGNOSIS — R14.0 BLOATING: ICD-10-CM

## 2020-06-09 PROBLEM — Z01.812 PRE-PROCEDURAL LABORATORY EXAMINATION: Status: RESOLVED | Noted: 2020-03-31 | Resolved: 2020-06-09

## 2020-06-09 PROCEDURE — 99214 OFFICE O/P EST MOD 30 MIN: CPT | Performed by: FAMILY MEDICINE

## 2020-06-09 RX ORDER — METHYLPHENIDATE HYDROCHLORIDE 10 MG/1
TABLET ORAL
Qty: 60 TAB | Refills: 0 | Status: SHIPPED | OUTPATIENT
Start: 2020-06-12 | End: 2020-06-09 | Stop reason: SDUPTHER

## 2020-06-09 RX ORDER — METHYLPHENIDATE HYDROCHLORIDE 10 MG/1
TABLET ORAL
Qty: 60 TAB | Refills: 0 | Status: SHIPPED | OUTPATIENT
Start: 2020-08-11 | End: 2020-09-10

## 2020-06-09 RX ORDER — METHYLPHENIDATE HYDROCHLORIDE 10 MG/1
TABLET ORAL
Qty: 60 TAB | Refills: 0 | Status: SHIPPED | OUTPATIENT
Start: 2020-07-11 | End: 2020-06-09 | Stop reason: SDUPTHER

## 2020-06-09 ASSESSMENT — FIBROSIS 4 INDEX: FIB4 SCORE: 1.66

## 2020-06-09 ASSESSMENT — PATIENT HEALTH QUESTIONNAIRE - PHQ9: CLINICAL INTERPRETATION OF PHQ2 SCORE: 0

## 2020-06-09 NOTE — ASSESSMENT & PLAN NOTE
Having bloating, worse after certain foods (ice cream), worse at night to point of having to get up. Ongoing for couple months. Has supposely clean colonoscopy 6-8 years ago. No change in BMs.

## 2020-06-09 NOTE — PROGRESS NOTES
Complaint: Bloating     Subjective:     Shashank Farmer is a 60 y.o. male here today for f/u. New problems.    Bloating  Having bloating, worse after certain foods (ice cream), worse at night to point of having to get up. Ongoing for couple months. Has supposely clean colonoscopy 6-8 years ago. No change in BMs.     Abdominal discomfort in left flank  Crampy pain in left lower abdomen. No change in BMs.    Attention deficit hyperactivity disorder (ADHD), predominantly inattentive type  Doing well on Ritalin. Needs refill x 3 months. [employer] queried.     No other concerns or complaints.    Current medicines (including changes today)  Current Outpatient Medications   Medication Sig Dispense Refill   • [START ON 2020] methylphenidate (RITALIN) 10 MG Tab Take one tab daily in AM and one in early afternoon daily. 60 Tab 0     No current facility-administered medications for this visit.      He  has a past medical history of ADHD, Allergy, H. pylori infection, and Low testosterone in male.    Health Maintenance:       Allergies: Patient has no known allergies.    Current Outpatient Medications Ordered in Epic   Medication Sig Dispense Refill   • [START ON 2020] methylphenidate (RITALIN) 10 MG Tab Take one tab daily in AM and one in early afternoon daily. 60 Tab 0     No current Epic-ordered facility-administered medications on file.        Past Medical History:   Diagnosis Date   • ADHD    • Allergy    • H. pylori infection    • Low testosterone in male        Past Surgical History:   Procedure Laterality Date   • APPENDECTOMY         Social History     Tobacco Use   • Smoking status: Former Smoker     Packs/day: 0.50     Years: 2.00     Pack years: 1.00     Types: Cigarettes     Start date: 1974     Last attempt to quit: 1976     Years since quittin.8   • Smokeless tobacco: Never Used   Substance Use Topics   • Alcohol use: Not Currently   • Drug use: Never       Social History     Social  "History Narrative   • Not on file       Family History   Problem Relation Age of Onset   • Stroke Mother    • Psychiatric Illness Brother         suicide         ROS Positive for bloating, LLQ discomfort  Patient denies any fever, chills, unintentional weight gain/loss, fatigue, stroke symptoms, dizziness, headache, nasal congestion, sore-throat, cough, heartburn, chest pain, difficulty breathing,  diarrhea/constipation, burning with urination or frequency, joint or back pain, skin rashes, depression or anxiety.       Objective:     /76 (BP Location: Left arm, Patient Position: Sitting, BP Cuff Size: Adult)   Pulse 65   Temp 36.1 °C (97 °F) (Temporal)   Resp 16   Ht 1.803 m (5' 11\")   Wt 69.7 kg (153 lb 10.6 oz)   SpO2 99%  Body mass index is 21.43 kg/m².   Physical Exam:  Constitutional: Alert, no distress.  Respiratory: Unlabored respiratory effort, lungs clear to auscultation, no wheezes, no ronchi.  Cardiovascular: Normal S1, S2, no murmur, no extremity edema.  Abdomen: Soft, non-tender, no masses, no hepatosplenomegaly.  Psych: Alert and oriented x3, appropriate affect and mood.        Assessment and Plan:   The following treatment plan was discussed    1. Bloating  Probably nutritional, recommend he observe which foods cause sxs, eliminate them from diet.  - REFERRAL TO GASTROENTEROLOGY    2. Abdominal discomfort in left flank  - REFERRAL TO GASTROENTEROLOGY    3. Attention deficit hyperactivity disorder (ADHD), predominantly inattentive type  Controlled on med.  - methylphenidate (RITALIN) 10 MG Tab; Take one tab daily in AM and one in early afternoon daily.  Dispense: 60 Tab; Refill: 2 dated      Followup: Return in about 3 months (around 9/9/2020) for to establish with new pcp.    Please note that this dictation was created using voice recognition software. I have made every reasonable attempt to correct obvious errors, but I expect that there are errors of grammar and possibly content that I " did not discover before finalizing the note.

## 2020-06-12 ENCOUNTER — PATIENT MESSAGE (OUTPATIENT)
Dept: HEALTH INFORMATION MANAGEMENT | Facility: OTHER | Age: 60
End: 2020-06-12

## 2020-06-15 ENCOUNTER — PATIENT MESSAGE (OUTPATIENT)
Dept: HEALTH INFORMATION MANAGEMENT | Facility: OTHER | Age: 60
End: 2020-06-15

## 2020-06-30 ENCOUNTER — TELEMEDICINE ORIGINATING SITE VISIT (OUTPATIENT)
Dept: MEDICAL GROUP | Facility: PHYSICIAN GROUP | Age: 60
End: 2020-06-30
Payer: COMMERCIAL

## 2020-06-30 ENCOUNTER — APPOINTMENT (OUTPATIENT)
Dept: SCHEDULING | Facility: IMAGING CENTER | Age: 60
End: 2020-06-30
Payer: COMMERCIAL

## 2021-08-10 ENCOUNTER — OFFICE VISIT (OUTPATIENT)
Dept: MEDICAL GROUP | Facility: PHYSICIAN GROUP | Age: 61
End: 2021-08-10
Payer: COMMERCIAL

## 2021-08-10 ENCOUNTER — HOSPITAL ENCOUNTER (OUTPATIENT)
Facility: MEDICAL CENTER | Age: 61
End: 2021-08-10
Attending: NURSE PRACTITIONER
Payer: COMMERCIAL

## 2021-08-10 VITALS
OXYGEN SATURATION: 98 % | TEMPERATURE: 97.5 F | DIASTOLIC BLOOD PRESSURE: 72 MMHG | HEART RATE: 65 BPM | RESPIRATION RATE: 16 BRPM | SYSTOLIC BLOOD PRESSURE: 122 MMHG | BODY MASS INDEX: 22.09 KG/M2 | WEIGHT: 157.8 LBS | HEIGHT: 71 IN

## 2021-08-10 DIAGNOSIS — G89.29 CHRONIC PAIN OF LEFT KNEE: ICD-10-CM

## 2021-08-10 DIAGNOSIS — M25.522 LEFT ELBOW PAIN: ICD-10-CM

## 2021-08-10 DIAGNOSIS — F90.0 ATTENTION DEFICIT HYPERACTIVITY DISORDER (ADHD), PREDOMINANTLY INATTENTIVE TYPE: ICD-10-CM

## 2021-08-10 DIAGNOSIS — R79.89 LOW TESTOSTERONE IN MALE: ICD-10-CM

## 2021-08-10 DIAGNOSIS — Z00.00 PHYSICAL EXAM, ANNUAL: ICD-10-CM

## 2021-08-10 DIAGNOSIS — E78.2 MIXED HYPERLIPIDEMIA: ICD-10-CM

## 2021-08-10 DIAGNOSIS — M25.562 CHRONIC PAIN OF LEFT KNEE: ICD-10-CM

## 2021-08-10 PROBLEM — R10.9 ABDOMINAL DISCOMFORT IN LEFT FLANK: Status: RESOLVED | Noted: 2020-03-31 | Resolved: 2021-08-10

## 2021-08-10 PROBLEM — R14.0 BLOATING: Status: RESOLVED | Noted: 2020-06-09 | Resolved: 2021-08-10

## 2021-08-10 PROCEDURE — 99214 OFFICE O/P EST MOD 30 MIN: CPT | Performed by: NURSE PRACTITIONER

## 2021-08-10 PROCEDURE — 80307 DRUG TEST PRSMV CHEM ANLYZR: CPT

## 2021-08-10 RX ORDER — LATANOPROST 50 UG/ML
SOLUTION/ DROPS OPHTHALMIC
COMMUNITY
Start: 2021-08-02 | End: 2022-03-09

## 2021-08-10 RX ORDER — METHYLPHENIDATE HYDROCHLORIDE 10 MG/1
10 TABLET ORAL 2 TIMES DAILY
Qty: 60 TABLET | Refills: 0 | Status: SHIPPED | OUTPATIENT
Start: 2021-08-10 | End: 2021-09-09

## 2021-08-10 ASSESSMENT — PATIENT HEALTH QUESTIONNAIRE - PHQ9: CLINICAL INTERPRETATION OF PHQ2 SCORE: 0

## 2021-08-10 ASSESSMENT — ENCOUNTER SYMPTOMS
CHILLS: 0
BLOOD IN STOOL: 0
DIZZINESS: 0
WEIGHT LOSS: 0
HEADACHES: 0
VOMITING: 0
COUGH: 0
PALPITATIONS: 0
SHORTNESS OF BREATH: 0
FEVER: 0
NAUSEA: 0
WEAKNESS: 0
DEPRESSION: 0
ABDOMINAL PAIN: 0
EYES NEGATIVE: 1

## 2021-08-10 ASSESSMENT — FIBROSIS 4 INDEX: FIB4 SCORE: 1.68

## 2021-08-10 NOTE — ASSESSMENT & PLAN NOTE
Not taking any medications for this  With last provider was going to work on lifestyle modifications  Will get new labs

## 2021-08-10 NOTE — ASSESSMENT & PLAN NOTE
Chronic and stable condition  States he was previously on Testosterone injections about 18 months ago  Had labs drawn in 2019 where his labs were normal at 326.   Requesting new labs  C/O some fatigue

## 2021-08-10 NOTE — ASSESSMENT & PLAN NOTE
Chronic and stable condition   Used to be very active with running marathons  Occasional discomfort  Has not tired any ice or heat therapy   Not currently taking any medications for this

## 2021-08-10 NOTE — PROGRESS NOTES
Chief Complaint   Patient presents with   • Establish Care   • Ankle Pain     x 6mons      Subjective:     Shashank Farmer is a 61 y.o. male presenting for establish care and management of      Mixed hyperlipidemia  Not taking any medications for this  With last provider was going to work on lifestyle modifications  Will get new labs    Low testosterone in male  Chronic and stable condition  States he was previously on Testosterone injections about 18 months ago  Had labs drawn in 2019 where his labs were normal at 326.   Requesting new labs  C/O some fatigue    Attention deficit hyperactivity disorder (ADHD), predominantly inattentive type  Chronic and stable condition   Was diagnosed back in 1999  Was prescribed Ritalin 10 mg twice daily one in the morning and one in the afternoon  Last prescription was with his past provider who subsequently retired and he never followed up to get another provider   Requesting refills today      Chronic pain of left knee  Chronic and stable condition   Used to be very active with running marathons  Occasional discomfort  Has not tired any ice or heat therapy   Not currently taking any medications for this      Left elbow pain  Chronic and stable condition   Noted at one point for 6 months   Occasional residual pain from elbow down to mid-forearm  States at time is swells at the elbow  Denies using any treatments for this  Denies chills, fever, heat, redness, numbness or tingling.         Review of Systems   Constitutional: Negative for chills, fever and weight loss.   HENT: Negative.    Eyes: Negative.    Respiratory: Negative for cough and shortness of breath.    Cardiovascular: Negative for chest pain, palpitations and leg swelling.   Gastrointestinal: Negative for abdominal pain, blood in stool, nausea and vomiting.   Genitourinary: Negative for dysuria and hematuria.   Musculoskeletal: Positive for joint pain.        Left knee pain intermittently with activity   Skin:  "Negative.    Neurological: Negative for dizziness, weakness and headaches.   Psychiatric/Behavioral: Negative for depression and suicidal ideas.            Current Outpatient Medications:   •  latanoprost (XALATAN) 0.005 % Solution, INSTILL 1 DROP INTO EACH EYE AT BEDTIME AS DIRECTED, Disp: , Rfl:   •  methylphenidate (RITALIN) 10 MG Tab, Take 1 tablet by mouth 2 times a day for 30 days., Disp: 60 tablet, Rfl: 0    Past Medical History:   Diagnosis Date   • Abdominal discomfort in left flank 3/31/2020   • ADHD    • Allergy    • Bloating 6/9/2020   • H. pylori infection    • Low testosterone in male        Past Surgical History:   Procedure Laterality Date   • APPENDECTOMY         Family History   Problem Relation Age of Onset   • Stroke Mother    • Psychiatric Illness Brother         suicide       Patient has no known allergies.    Allergies, past medical history, past surgical history, family history, social history reviewed and updated    Objective:     Vitals: /72   Pulse 65   Temp 36.4 °C (97.5 °F) (Temporal)   Resp 16   Ht 1.791 m (5' 10.5\")   Wt 71.6 kg (157 lb 12.8 oz)   SpO2 98%   BMI 22.32 kg/m²   General: Alert, pleasant, NAD  EYES:   PERRL, EOMI, no icterus or pallor.  Conjunctivae and lids normal.   HENT:  Normocephalic.  External ears normal. Tympanic membranes pearly, opaque.  No nasal drainage present.  Oropharynx non-erythematous, mucous membranes moist.  Neck supple.   No thyromegaly or masses palpated. No cervical or supraclavicular lymphadenopathy.  Heart: Regular rate and rhythm.  S1 and S2 normal.  No murmurs appreciated.  Respiratory: Normal respiratory effort.  Clear to auscultation bilaterally.  Abdomen: Non-distended, soft, non-tender, no guarding/rebound. Bowel sounds present.  No hepatosplenomegaly.  No hernias.  Skin: Warm, dry, no rashes.  Musculoskeletal: Gait is normal.  Moves all extremities well.    Extremities: No clubbing, cyanosis or edema noted.   Neurological: No " tremors, sensation grossly intact, tone/strength normal, gait is normal, CN2-12 intact.  Psych:  Affect/mood is normal, judgement is good, memory is intact, grooming is appropriate.    Assessment/Plan:     1. Physical exam, annual  - CBC WITH DIFFERENTIAL; Future  - Comp Metabolic Panel; Future  - Lipid Profile; Future  - TESTOSTERONE, FREE AND TOTAL; Future    2. Low testosterone in male  - TESTOSTERONE, FREE AND TOTAL; Future    3. Mixed hyperlipidemia  - Lipid Profile; Future    4. Left elbow pain  Will continue to monitor     5. Attention deficit hyperactivity disorder (ADHD), predominantly inattentive type  - methylphenidate (RITALIN) 10 MG Tab; Take 1 tablet by mouth 2 times a day for 30 days.  Dispense: 60 tablet; Refill: 0  - Controlled Substance Treatment Agreement  - URINE DRUG SCREEN    6. Chronic pain of left knee  Will continue to monitor    Discussed with patient possible alternative diagnoses, patient is to take all medications as prescribed.     If symptoms persist FU w/PCP, if symptoms worsen go to emergency room.     If experiencing any side effects from prescribed medications reports to the office immediately or go to emergency room.    Reviewed indication, dosage, usage and potential adverse effects of prescribed medications.     Reviewed risks and benefits of treatment plan. Patient verbalizes understanding of all instruction and verbally agrees to plan.    Discussed plan with the patient, and she agrees to the above.      I personally reviewed prior external notes and test results pertinent to today's visit.        Return in about 4 weeks (around 9/7/2021).    My total time spent caring for the patient on the day of the encounter was 30 minutes.   This does not include time spent on separately billable procedures/tests.     Please note that this dictation was created using voice recognition software. I have made every reasonable attempt to correct obvious errors, but I expect that there may be  errors of grammar and possibly content that I did not discover before finalizing the note.

## 2021-08-10 NOTE — ASSESSMENT & PLAN NOTE
Chronic and stable condition   Noted at one point for 6 months   Occasional residual pain from elbow down to mid-forearm  States at time is swells at the elbow  Denies using any treatments for this  Denies chills, fever, heat, redness, numbness or tingling.

## 2021-08-10 NOTE — ASSESSMENT & PLAN NOTE
Chronic and stable condition   Was diagnosed back in 1999  Was prescribed Ritalin 10 mg twice daily one in the morning and one in the afternoon  Last prescription was with his past provider who subsequently retired and he never followed up to get another provider   Requesting refills today

## 2021-08-10 NOTE — PATIENT INSTRUCTIONS
1. Get labs completed prior to our next visit.  These will be fasting labs, do not eat or drink 8 to 10 hours prior to your labs.  Make sure that you drink lots of water.  We will discuss the results of these at our next appointment. Please get these done ELLI AM for the testosterone     2.. Prescriptions  sent to [facility] in [place].

## 2021-08-11 ENCOUNTER — HOSPITAL ENCOUNTER (OUTPATIENT)
Dept: LAB | Facility: MEDICAL CENTER | Age: 61
End: 2021-08-11
Attending: NURSE PRACTITIONER
Payer: COMMERCIAL

## 2021-08-11 DIAGNOSIS — Z00.00 PHYSICAL EXAM, ANNUAL: ICD-10-CM

## 2021-08-11 DIAGNOSIS — R79.89 LOW TESTOSTERONE IN MALE: ICD-10-CM

## 2021-08-11 DIAGNOSIS — E78.2 MIXED HYPERLIPIDEMIA: ICD-10-CM

## 2021-08-11 LAB
ALBUMIN SERPL BCP-MCNC: 4.2 G/DL (ref 3.2–4.9)
ALBUMIN/GLOB SERPL: 1.5 G/DL
ALP SERPL-CCNC: 78 U/L (ref 30–99)
ALT SERPL-CCNC: 26 U/L (ref 2–50)
ANION GAP SERPL CALC-SCNC: 10 MMOL/L (ref 7–16)
AST SERPL-CCNC: 25 U/L (ref 12–45)
BASOPHILS # BLD AUTO: 0.9 % (ref 0–1.8)
BASOPHILS # BLD: 0.04 K/UL (ref 0–0.12)
BILIRUB SERPL-MCNC: 0.4 MG/DL (ref 0.1–1.5)
BUN SERPL-MCNC: 16 MG/DL (ref 8–22)
CALCIUM SERPL-MCNC: 8.8 MG/DL (ref 8.5–10.5)
CHLORIDE SERPL-SCNC: 106 MMOL/L (ref 96–112)
CHOLEST SERPL-MCNC: 215 MG/DL (ref 100–199)
CO2 SERPL-SCNC: 23 MMOL/L (ref 20–33)
CREAT SERPL-MCNC: 0.93 MG/DL (ref 0.5–1.4)
EOSINOPHIL # BLD AUTO: 0.14 K/UL (ref 0–0.51)
EOSINOPHIL NFR BLD: 3 % (ref 0–6.9)
ERYTHROCYTE [DISTWIDTH] IN BLOOD BY AUTOMATED COUNT: 43.5 FL (ref 35.9–50)
GLOBULIN SER CALC-MCNC: 2.8 G/DL (ref 1.9–3.5)
GLUCOSE SERPL-MCNC: 101 MG/DL (ref 65–99)
HCT VFR BLD AUTO: 41.6 % (ref 42–52)
HDLC SERPL-MCNC: 36 MG/DL
HGB BLD-MCNC: 14 G/DL (ref 14–18)
IMM GRANULOCYTES # BLD AUTO: 0.02 K/UL (ref 0–0.11)
IMM GRANULOCYTES NFR BLD AUTO: 0.4 % (ref 0–0.9)
LDLC SERPL CALC-MCNC: 137 MG/DL
LYMPHOCYTES # BLD AUTO: 1.32 K/UL (ref 1–4.8)
LYMPHOCYTES NFR BLD: 28.7 % (ref 22–41)
MCH RBC QN AUTO: 30.1 PG (ref 27–33)
MCHC RBC AUTO-ENTMCNC: 33.7 G/DL (ref 33.7–35.3)
MCV RBC AUTO: 89.5 FL (ref 81.4–97.8)
MONOCYTES # BLD AUTO: 0.41 K/UL (ref 0–0.85)
MONOCYTES NFR BLD AUTO: 8.9 % (ref 0–13.4)
NEUTROPHILS # BLD AUTO: 2.67 K/UL (ref 1.82–7.42)
NEUTROPHILS NFR BLD: 58.1 % (ref 44–72)
NRBC # BLD AUTO: 0 K/UL
NRBC BLD-RTO: 0 /100 WBC
PLATELET # BLD AUTO: 214 K/UL (ref 164–446)
PMV BLD AUTO: 10.4 FL (ref 9–12.9)
POTASSIUM SERPL-SCNC: 4.3 MMOL/L (ref 3.6–5.5)
PROT SERPL-MCNC: 7 G/DL (ref 6–8.2)
RBC # BLD AUTO: 4.65 M/UL (ref 4.7–6.1)
SODIUM SERPL-SCNC: 139 MMOL/L (ref 135–145)
TRIGL SERPL-MCNC: 211 MG/DL (ref 0–149)
WBC # BLD AUTO: 4.6 K/UL (ref 4.8–10.8)

## 2021-08-11 PROCEDURE — 80053 COMPREHEN METABOLIC PANEL: CPT

## 2021-08-11 PROCEDURE — 36415 COLL VENOUS BLD VENIPUNCTURE: CPT

## 2021-08-11 PROCEDURE — 84403 ASSAY OF TOTAL TESTOSTERONE: CPT

## 2021-08-11 PROCEDURE — 84270 ASSAY OF SEX HORMONE GLOBUL: CPT

## 2021-08-11 PROCEDURE — 85025 COMPLETE CBC W/AUTO DIFF WBC: CPT

## 2021-08-11 PROCEDURE — 80061 LIPID PANEL: CPT

## 2021-08-11 PROCEDURE — 84402 ASSAY OF FREE TESTOSTERONE: CPT

## 2021-08-13 LAB
AMPHETAMINES UR QL: NEGATIVE NG/ML
BARBITURATES UR QL: NEGATIVE NG/ML
BENZODIAZ UR QL: NEGATIVE NG/ML
CANNABINOIDS UR QL SCN: NEGATIVE NG/ML
COCAINE UR QL: NEGATIVE NG/ML
DRUG SCREEN COMMENT UR-IMP: NORMAL
MDMA CTO UR SCN-MCNC: NEGATIVE NG/ML
METHADONE UR QL: NEGATIVE NG/ML
OPIATES UR QL: NEGATIVE NG/ML
OXYCODONE CTO UR SCN-MCNC: NEGATIVE NG/ML
PCP UR QL SCN: NEGATIVE NG/ML
PROPOXYPH UR QL: NEGATIVE NG/ML
SHBG SERPL-SCNC: 40 NMOL/L (ref 11–80)
TESTOST FREE MFR SERPL: 1.7 % (ref 1.6–2.9)
TESTOST FREE SERPL-MCNC: 74 PG/ML (ref 47–244)
TESTOST SERPL-MCNC: 443 NG/DL (ref 300–720)

## 2021-08-31 ENCOUNTER — OFFICE VISIT (OUTPATIENT)
Dept: MEDICAL GROUP | Facility: PHYSICIAN GROUP | Age: 61
End: 2021-08-31
Payer: COMMERCIAL

## 2021-08-31 VITALS
TEMPERATURE: 97.8 F | WEIGHT: 159 LBS | RESPIRATION RATE: 12 BRPM | DIASTOLIC BLOOD PRESSURE: 78 MMHG | SYSTOLIC BLOOD PRESSURE: 118 MMHG | HEART RATE: 69 BPM | BODY MASS INDEX: 22.26 KG/M2 | OXYGEN SATURATION: 96 % | HEIGHT: 71 IN

## 2021-08-31 DIAGNOSIS — M76.61 ACHILLES TENDINITIS OF RIGHT LOWER EXTREMITY: ICD-10-CM

## 2021-08-31 DIAGNOSIS — Z12.11 SCREENING FOR COLORECTAL CANCER: ICD-10-CM

## 2021-08-31 DIAGNOSIS — Z12.12 SCREENING FOR COLORECTAL CANCER: ICD-10-CM

## 2021-08-31 PROCEDURE — 99213 OFFICE O/P EST LOW 20 MIN: CPT | Performed by: NURSE PRACTITIONER

## 2021-08-31 ASSESSMENT — ENCOUNTER SYMPTOMS
ABDOMINAL PAIN: 0
CHILLS: 0
PALPITATIONS: 0
COUGH: 0
WEAKNESS: 0
HEADACHES: 0
WEIGHT LOSS: 0
FEVER: 0
DIZZINESS: 0
SHORTNESS OF BREATH: 0

## 2021-08-31 ASSESSMENT — FIBROSIS 4 INDEX: FIB4 SCORE: 1.44

## 2021-08-31 NOTE — PROGRESS NOTES
CC:  Chief Complaint   Patient presents with   • Lab Results       HISTORY OF PRESENT ILLNESS: Patient is a 61 y.o. male established patient presenting follow up labs and right achilles discomfort      Achilles tendinitis of right lower extremity  Chronic and stable condition   Has noticed for last 6 months  Initially bilateral pain in both Achilles tendons when first getting up out of chair and walking to waking up first thing in the morning he notices discomfort.   Has not tired any treatments.  He walks his dogs, 7 of them, 5 days a week.  Denies any popping sounds or sharp pain that made it difficult to walk.       Patient Active Problem List    Diagnosis Date Noted   • Achilles tendinitis of right lower extremity 2021   • Left elbow pain 08/10/2021   • Mixed hyperlipidemia 2019   • Chronic pain of left knee 2019   • Attention deficit hyperactivity disorder (ADHD), predominantly inattentive type 2019   • Low testosterone in male 2019      Allergies:Patient has no known allergies.    Current Outpatient Medications   Medication Sig Dispense Refill   • latanoprost (XALATAN) 0.005 % Solution INSTILL 1 DROP INTO EACH EYE AT BEDTIME AS DIRECTED     • methylphenidate (RITALIN) 10 MG Tab Take 1 tablet by mouth 2 times a day for 30 days. 60 tablet 0     No current facility-administered medications for this visit.       Social History     Tobacco Use   • Smoking status: Former Smoker     Packs/day: 0.50     Years: 2.00     Pack years: 1.00     Types: Cigarettes     Start date: 1974     Quit date: 1976     Years since quittin.0   • Smokeless tobacco: Never Used   Vaping Use   • Vaping Use: Never used   Substance Use Topics   • Alcohol use: Not Currently   • Drug use: Never     Social History     Social History Narrative   • Not on file       Family History   Problem Relation Age of Onset   • Stroke Mother    • Psychiatric Illness Brother         suicide        Review of Systems  "  Constitutional: Negative for chills, fever and weight loss.   Respiratory: Negative for cough and shortness of breath.    Cardiovascular: Negative for chest pain, palpitations and leg swelling.   Gastrointestinal: Negative for abdominal pain.   Musculoskeletal:        Achilles tendon pain right    Neurological: Negative for dizziness, weakness and headaches.             - NOTE: All other systems reviewed and are negative, except as in HPI.      Exam:    /78 (BP Location: Left arm, Patient Position: Sitting, BP Cuff Size: Adult)   Pulse 69   Temp 36.6 °C (97.8 °F) (Temporal)   Resp 12   Ht 1.791 m (5' 10.5\")   Wt 72.1 kg (159 lb)   SpO2 96%  Body mass index is 22.49 kg/m².    General: Alert, pleasant, NAD  EYES:   PERRL, EOMI, no icterus or pallor.  Conjunctivae and lids normal.   HENT:  Normocephalic.  External ears normal.   Heart: Regular rate and rhythm.  S1 and S2 normal.  No murmurs appreciated.  Respiratory: Normal respiratory effort.  Clear to auscultation bilaterally.  Skin: Warm, dry, no rashes.  Musculoskeletal: Gait is normal.  Moves all extremities well.  TTP to right achilles tendon. Negative swelling, redness or heat noted. Woodruff test negative. When walking while pushing off toe box pt c/o pain, normal gait  Extremities: No clubbing, cyanosis or edema noted.   Neurological: No tremors, sensation grossly intact, tone/strength normal, gait is normal, CN2-12 intact.  Psych:  Affect/mood is normal, judgement is good, memory is intact, grooming is appropriate.      LABS: 8/11/2021: Results reviewed and discussed with the patient, questions answered.    Assessment/Plan:  1. Screening for colorectal cancer  - Occult Blood Feces Immunoassay (FIT); Future    2. Achilles tendinitis of right lower extremity  1- Ibuprofen 4-6 hours as needed 400mg-600mg for next 7-10 days  2-Ice as needed and at night  3-brace for achillis tendon - can get a [facility], [facility], [facility]  4-Rest- reduce walking long " distances try to rest the tendon         Discussed with patient possible alternative diagnoses, patient is to take all medications as prescribed.     If symptoms persist FU w/PCP, if symptoms worsen go to emergency room.     If experiencing any side effects from prescribed medications reports to the office immediately or go to emergency room.    Reviewed indication, dosage, usage and potential adverse effects of prescribed medications.     Reviewed risks and benefits of treatment plan. Patient verbalizes understanding of all instruction and verbally agrees to plan.      Return in about 10 days (around 9/10/2021) for follow up on tendon.    My total time spent caring for the patient on the day of the encounter was 20 minutes.   This does not include time spent on separately billable procedures/tests.     Please note that this dictation was created using voice recognition software. I have made every reasonable attempt to correct obvious errors, but I expect that there are errors of grammar and possibly content that I did not discover before finalizing the note.

## 2021-08-31 NOTE — PATIENT INSTRUCTIONS
1- Ibuprofen 4-6 hours as needed 400mg-600mg for next 7-10 days  2-Ice as needed and at night  3-brace for achillis tendon - can get a [facility], [facility], [facility]  4-Rest- reduce walking long distances try to rest the tendon  5. increase fiber intake

## 2021-08-31 NOTE — ASSESSMENT & PLAN NOTE
Chronic and stable condition   Has noticed for last 6 months  Initially bilateral pain in both Achilles tendons when first getting up out of chair and walking to waking up first thing in the morning he notices discomfort.   Has not tired any treatments.  He walks his dogs, 7 of them, 5 days a week.  Denies any popping sounds or sharp pain that made it difficult to walk.

## 2021-09-15 ENCOUNTER — HOSPITAL ENCOUNTER (OUTPATIENT)
Facility: MEDICAL CENTER | Age: 61
End: 2021-09-15
Attending: NURSE PRACTITIONER
Payer: COMMERCIAL

## 2021-09-15 PROCEDURE — 82274 ASSAY TEST FOR BLOOD FECAL: CPT

## 2021-09-21 DIAGNOSIS — Z12.11 SCREENING FOR COLORECTAL CANCER: ICD-10-CM

## 2021-09-21 DIAGNOSIS — Z12.12 SCREENING FOR COLORECTAL CANCER: ICD-10-CM

## 2021-09-23 LAB — IMM ASSAY OCC BLD FITOB: NEGATIVE

## 2022-03-09 ENCOUNTER — OFFICE VISIT (OUTPATIENT)
Dept: MEDICAL GROUP | Facility: PHYSICIAN GROUP | Age: 62
End: 2022-03-09
Payer: COMMERCIAL

## 2022-03-09 VITALS
TEMPERATURE: 98.2 F | RESPIRATION RATE: 12 BRPM | OXYGEN SATURATION: 97 % | HEIGHT: 71 IN | BODY MASS INDEX: 22.88 KG/M2 | HEART RATE: 72 BPM | SYSTOLIC BLOOD PRESSURE: 112 MMHG | WEIGHT: 163.4 LBS | DIASTOLIC BLOOD PRESSURE: 64 MMHG

## 2022-03-09 DIAGNOSIS — B35.1 ONYCHOMYCOSIS: ICD-10-CM

## 2022-03-09 DIAGNOSIS — F90.0 ATTENTION DEFICIT HYPERACTIVITY DISORDER (ADHD), PREDOMINANTLY INATTENTIVE TYPE: ICD-10-CM

## 2022-03-09 PROCEDURE — 99214 OFFICE O/P EST MOD 30 MIN: CPT | Performed by: NURSE PRACTITIONER

## 2022-03-09 RX ORDER — METHYLPHENIDATE HYDROCHLORIDE 10 MG/1
10 TABLET ORAL 2 TIMES DAILY
COMMUNITY
End: 2022-03-22 | Stop reason: SDUPTHER

## 2022-03-09 ASSESSMENT — ENCOUNTER SYMPTOMS
HEADACHES: 0
CHILLS: 0
FEVER: 0
WEIGHT LOSS: 0
DIZZINESS: 0

## 2022-03-09 ASSESSMENT — FIBROSIS 4 INDEX: FIB4 SCORE: 1.47

## 2022-03-09 ASSESSMENT — PATIENT HEALTH QUESTIONNAIRE - PHQ9: CLINICAL INTERPRETATION OF PHQ2 SCORE: 0

## 2022-03-09 NOTE — PROGRESS NOTES
CC:  Chief Complaint   Patient presents with   • Nail Problem     X6mons, Both hands, both feet   • Medication Refill     methylphenidate (RITALIN) 10 MG Tab       HISTORY OF PRESENT ILLNESS: Patient is a 62 y.o. male established patient presenting for onychomycosis      Onychomycosis  X 4 months  Toe and finger nail fungus  bilateral great toes and left index finger and thumb  Has not started any treatments  States he has had this in the past before and was taking a oral medication       Patient Active Problem List    Diagnosis Date Noted   • Onychomycosis 2022   • Achilles tendinitis of right lower extremity 2021   • Left elbow pain 08/10/2021   • Mixed hyperlipidemia 2019   • Chronic pain of left knee 2019   • Attention deficit hyperactivity disorder (ADHD), predominantly inattentive type 2019   • Low testosterone in male 2019      Allergies:Patient has no known allergies.    Current Outpatient Medications   Medication Sig Dispense Refill   • methylphenidate (RITALIN) 10 MG Tab Take 10 mg by mouth 2 times a day.     • Efinaconazole 10 % Solution Apply 1 Application topically every day. 8 mL 5     No current facility-administered medications for this visit.       Social History     Tobacco Use   • Smoking status: Former Smoker     Packs/day: 0.50     Years: 2.00     Pack years: 1.00     Types: Cigarettes     Start date: 1974     Quit date: 1976     Years since quittin.6   • Smokeless tobacco: Never Used   Vaping Use   • Vaping Use: Never used   Substance Use Topics   • Alcohol use: Not Currently   • Drug use: Never     Social History     Social History Narrative   • Not on file       Family History   Problem Relation Age of Onset   • Stroke Mother    • Psychiatric Illness Brother         suicide        Review of Systems   Constitutional: Negative for chills, fever and weight loss.   Skin:        Fungal infection to index and thumb or left hand and bilateral great  "toes   Neurological: Negative for dizziness and headaches.             - NOTE: All other systems reviewed and are negative, except as in HPI.      Exam:    /64   Pulse 72   Temp 36.8 °C (98.2 °F) (Temporal)   Resp 12   Ht 1.791 m (5' 10.5\")   Wt 74.1 kg (163 lb 6.4 oz)   SpO2 97%  Body mass index is 23.11 kg/m².    General: Alert, pleasant, NAD  EYES:   PERRL, EOMI, no icterus or pallor.  Conjunctivae and lids normal.   HENT:  Normocephalic.  External ears normal.   Skin: Warm, dry, no rashes. Noted discoloration to left index and thumb nail bed and bilateral great toes  Musculoskeletal: Gait is normal.  Moves all extremities well.    Extremities: No clubbing, cyanosis or edema noted.   Neurological: No tremors, sensation grossly intact, tone/strength normal, gait is normal, CN2-12 intact.  Psych:  Affect/mood is normal, judgement is good, memory is intact, grooming is appropriate.    Assessment/Plan:  1. Onychomycosis  - Efinaconazole 10 % Solution; Apply 1 Application topically every day.  Dispense: 8 mL; Refill: 5       Discussed with patient possible alternative diagnoses, patient is to take all medications as prescribed.     If symptoms persist FU w/PCP, if symptoms worsen go to emergency room.     If experiencing any side effects from prescribed medications reports to the office immediately or go to emergency room.    Reviewed indication, dosage, usage and potential adverse effects of prescribed medications.     Reviewed risks and benefits of treatment plan. Patient verbalizes understanding of all instruction and verbally agrees to plan.      No follow-ups on file.    My total time spent caring for the patient on the day of the encounter was 22 minutes.   This does not include time spent on separately billable procedures/tests.     Please note that this dictation was created using voice recognition software. I have made every reasonable attempt to correct obvious errors, but I expect that there are " errors of grammar and possibly content that I did not discover before finalizing the note.

## 2022-03-09 NOTE — ASSESSMENT & PLAN NOTE
X 4 months  Toe and finger nail fungus  bilateral great toes and left index finger and thumb  Has not started any treatments  States he has had this in the past before and was taking a oral medication

## 2022-03-09 NOTE — PATIENT INSTRUCTIONS
Fungal Nail Infection  A fungal nail infection is a common infection of the toenails or fingernails. This condition affects toenails more often than fingernails. It often affects the great, or big, toes. More than one nail may be infected. The condition can be passed from person to person (is contagious).    What are the causes?  This condition is caused by a fungus. Several types of fungi can cause the infection. These fungi are common in moist and warm areas. If your hands or feet come into contact with the fungus, it may get into a crack in your fingernail or toenail and cause the infection.    What increases the risk?  The following factors may make you more likely to develop this condition:  · Being male.  · Being of older age.  · Living with someone who has the fungus.  · Walking barefoot in areas where the fungus thrives, such as showers or locker rooms.  · Wearing shoes and socks that cause your feet to sweat.  · Having a nail injury or a recent nail surgery.  · Having certain medical conditions, such as:  ? Athlete's foot.  ? Diabetes.  ? Psoriasis.  ? Poor circulation.  ? A weak body defense system (immune system).  What are the signs or symptoms?  Symptoms of this condition include:  · A pale spot on the nail.  · Thickening of the nail.  · A nail that becomes yellow or brown.  · A brittle or ragged nail edge.  · A crumbling nail.  · A nail that has lifted away from the nail bed.  How is this diagnosed?  This condition is diagnosed with a physical exam. Your health care provider may take a scraping or clipping from your nail to test for the fungus.  How is this treated?  Treatment is not needed for mild infections. If you have significant nail changes, treatment may include:  · Antifungal medicines taken by mouth (orally). You may need to take the medicine for several weeks or several months, and you may not see the results for a long time. These medicines can cause side effects. Ask your health care  provider what problems to watch for.  · Antifungal nail polish or nail cream. These may be used along with oral antifungal medicines.  · Laser treatment of the nail.  · Surgery to remove the nail. This may be needed for the most severe infections.  It can take a long time, usually up to a year, for the infection to go away. The infection may also come back.  Follow these instructions at home:  Medicines  · Take or apply over-the-counter and prescription medicines only as told by your health care provider.  · Ask your health care provider about using over-the-counter mentholated ointment on your nails.  Nail care  · Trim your nails often.  · Wash and dry your hands and feet every day.  · Keep your feet dry:  ? Wear absorbent socks, and change your socks frequently.  ? Wear shoes that allow air to circulate, such as sandals or canvas tennis shoes. Throw out old shoes.  · Do not use artificial nails.  · If you go to a nail salon, make sure you choose one that uses clean instruments.  · Use antifungal foot powder on your feet and in your shoes.  General instructions  · Do not share personal items, such as towels or nail clippers.  · Do not walk barefoot in shower rooms or locker rooms.  · Wear rubber gloves if you are working with your hands in wet areas.  · Keep all follow-up visits as told by your health care provider. This is important.  Contact a health care provider if:  Your infection is not getting better or it is getting worse after several months.  Summary  · A fungal nail infection is a common infection of the toenails or fingernails.  · Treatment is not needed for mild infections. If you have significant nail changes, treatment may include taking medicine orally and applying medicine to your nails.  · It can take a long time, usually up to a year, for the infection to go away. The infection may also come back.  · Take or apply over-the-counter and prescription medicines only as told by your health care  provider.  · Follow instructions for taking care of your nails to help prevent infection from coming back or spreading.  This information is not intended to replace advice given to you by your health care provider. Make sure you discuss any questions you have with your health care provider.  Document Released: 12/15/2001 Document Revised: 04/09/2020 Document Reviewed: 05/24/2019  Elsevier Patient Education © 2020 [employer]

## 2022-03-22 ENCOUNTER — OFFICE VISIT (OUTPATIENT)
Dept: MEDICAL GROUP | Facility: PHYSICIAN GROUP | Age: 62
End: 2022-03-22
Payer: COMMERCIAL

## 2022-03-22 ENCOUNTER — HOSPITAL ENCOUNTER (OUTPATIENT)
Facility: MEDICAL CENTER | Age: 62
End: 2022-03-22
Attending: NURSE PRACTITIONER
Payer: COMMERCIAL

## 2022-03-22 VITALS
DIASTOLIC BLOOD PRESSURE: 70 MMHG | RESPIRATION RATE: 16 BRPM | SYSTOLIC BLOOD PRESSURE: 126 MMHG | OXYGEN SATURATION: 98 % | TEMPERATURE: 98.4 F | HEART RATE: 78 BPM | BODY MASS INDEX: 22.73 KG/M2 | WEIGHT: 162.4 LBS | HEIGHT: 71 IN

## 2022-03-22 DIAGNOSIS — Z91.89 OTHER SPECIFIED PERSONAL RISK FACTORS, NOT ELSEWHERE CLASSIFIED: ICD-10-CM

## 2022-03-22 DIAGNOSIS — U07.1 COVID: ICD-10-CM

## 2022-03-22 DIAGNOSIS — F90.0 ATTENTION DEFICIT HYPERACTIVITY DISORDER (ADHD), PREDOMINANTLY INATTENTIVE TYPE: ICD-10-CM

## 2022-03-22 DIAGNOSIS — E78.2 MIXED HYPERLIPIDEMIA: ICD-10-CM

## 2022-03-22 DIAGNOSIS — B35.1 ONYCHOMYCOSIS: ICD-10-CM

## 2022-03-22 PROCEDURE — 99214 OFFICE O/P EST MOD 30 MIN: CPT | Mod: CS | Performed by: NURSE PRACTITIONER

## 2022-03-22 PROCEDURE — U0005 INFEC AGEN DETEC AMPLI PROBE: HCPCS

## 2022-03-22 PROCEDURE — U0003 INFECTIOUS AGENT DETECTION BY NUCLEIC ACID (DNA OR RNA); SEVERE ACUTE RESPIRATORY SYNDROME CORONAVIRUS 2 (SARS-COV-2) (CORONAVIRUS DISEASE [COVID-19]), AMPLIFIED PROBE TECHNIQUE, MAKING USE OF HIGH THROUGHPUT TECHNOLOGIES AS DESCRIBED BY CMS-2020-01-R: HCPCS

## 2022-03-22 RX ORDER — METHYLPHENIDATE HYDROCHLORIDE 10 MG/1
10 TABLET ORAL 2 TIMES DAILY
Qty: 60 TABLET | Refills: 0 | Status: SHIPPED | OUTPATIENT
Start: 2022-03-22 | End: 2022-04-21

## 2022-03-22 RX ORDER — METHYLPHENIDATE HYDROCHLORIDE 10 MG/1
10 TABLET ORAL 2 TIMES DAILY
Qty: 60 TABLET | Refills: 0 | Status: SHIPPED | OUTPATIENT
Start: 2022-04-22 | End: 2022-05-22

## 2022-03-22 RX ORDER — CICLOPIROX 80 MG/ML
SOLUTION TOPICAL
Qty: 6.6 ML | Refills: 2 | Status: SHIPPED | OUTPATIENT
Start: 2022-03-22 | End: 2022-08-09

## 2022-03-22 RX ORDER — METHYLPHENIDATE HYDROCHLORIDE 10 MG/1
10 TABLET ORAL 2 TIMES DAILY
Qty: 60 TABLET | Refills: 0 | Status: SHIPPED | OUTPATIENT
Start: 2022-05-23 | End: 2022-06-22

## 2022-03-22 ASSESSMENT — ENCOUNTER SYMPTOMS
WEIGHT LOSS: 0
CHILLS: 0
MEMORY LOSS: 0
HEADACHES: 0
FEVER: 0
NERVOUS/ANXIOUS: 0
DIZZINESS: 0

## 2022-03-22 ASSESSMENT — FIBROSIS 4 INDEX: FIB4 SCORE: 1.47

## 2022-03-22 NOTE — ASSESSMENT & PLAN NOTE
Chronic and stable condition  Discussed with pt statin use, history of smoking, family history of strokes  Would like to get Calcium scoring completed.

## 2022-03-22 NOTE — ASSESSMENT & PLAN NOTE
Chronic and stable condition  Previous medication ordered was not cost effective  Is willing to try Laquer for 1 month then follow up   May need to see podiatry for removal of toenails or retry oral meds which he has been on before

## 2022-03-22 NOTE — PROGRESS NOTES
CC:  Chief Complaint   Patient presents with   • Medication Problem     Alternative to Efinaconazole   • Other     Need covid test for travel   • Medication Refill     methylphenidate       HISTORY OF PRESENT ILLNESS: Patient is a 62 y.o. male established patient presenting for refills, COVID testing for travel and new medication option for onychomycosis       Mixed hyperlipidemia  Chronic and stable condition  Discussed with pt statin use, history of smoking, family history of strokes  Would like to get Calcium scoring completed.     Attention deficit hyperactivity disorder (ADHD), predominantly inattentive type  Chronic and stable condition   [facility] contract completed until 8/2022  Needs new refills    Onychomycosis  Chronic and stable condition  Previous medication ordered was not cost effective  Is willing to try Laquer for 1 month then follow up   May need to see podiatry for removal of toenails or retry oral meds which he has been on before      Patient Active Problem List    Diagnosis Date Noted   • Onychomycosis 03/09/2022   • Achilles tendinitis of right lower extremity 08/31/2021   • Left elbow pain 08/10/2021   • Mixed hyperlipidemia 09/12/2019   • Chronic pain of left knee 09/12/2019   • Attention deficit hyperactivity disorder (ADHD), predominantly inattentive type 08/13/2019   • Low testosterone in male 08/13/2019      Allergies:Patient has no known allergies.    Current Outpatient Medications   Medication Sig Dispense Refill   • ciclopirox (PENLAC) 8 % solution Apply to adjacent skin and nail daily and remove every 7 days with alcohol pad and reapply again. 6.6 mL 2   • methylphenidate (RITALIN) 10 MG Tab Take 1 Tablet by mouth 2 times a day for 30 days. 60 Tablet 0   • [START ON 4/22/2022] methylphenidate (RITALIN) 10 MG Tab Take 1 Tablet by mouth 2 times a day for 30 days. 60 Tablet 0   • [START ON 5/23/2022] methylphenidate (RITALIN) 10 MG Tab Take 1 Tablet by mouth 2 times a day for 30 days. 60 Tablet  "0     No current facility-administered medications for this visit.       Social History     Tobacco Use   • Smoking status: Former Smoker     Packs/day: 0.50     Years: 2.00     Pack years: 1.00     Types: Cigarettes     Start date: 1974     Quit date: 1976     Years since quittin.6   • Smokeless tobacco: Never Used   Vaping Use   • Vaping Use: Never used   Substance Use Topics   • Alcohol use: Not Currently   • Drug use: Never     Social History     Social History Narrative   • Not on file       Family History   Problem Relation Age of Onset   • Stroke Mother    • Psychiatric Illness Brother         suicide        Review of Systems   Constitutional: Negative for chills, fever, malaise/fatigue and weight loss.   Skin:        Toe nail and finger nail fungus   Neurological: Negative for dizziness and headaches.   Psychiatric/Behavioral: Negative for memory loss. The patient is not nervous/anxious.         Inattention at times due to ADHD             - NOTE: All other systems reviewed and are negative, except as in HPI.      Exam:    /70   Pulse 78   Temp 36.9 °C (98.4 °F) (Temporal)   Resp 16   Ht 1.791 m (5' 10.5\")   Wt 73.7 kg (162 lb 6.4 oz)   SpO2 98%  Body mass index is 22.97 kg/m².    General: Alert, pleasant, NAD  EYES:   PERRL, EOMI, no icterus or pallor.  Conjunctivae and lids normal.   HENT:  Normocephalic.  External ears normal.   Respiratory: Normal respiratory effort.   Skin: Warm, dry, no rashes.  Musculoskeletal: Gait is normal.  Moves all extremities well.    Extremities: No clubbing, cyanosis or edema noted.   Neurological: No tremors, sensation grossly intact, tone/strength normal, gait is normal, CN2-12 intact.  Psych:  Affect/mood is normal, judgement is good, memory is intact, grooming is appropriate.    Assessment/Plan:  1. COVID  - SARS-CoV-2 PCR (24 hour In-House): Collect NP swab in [facility]; Future  For travel    2. Onychomycosis  - ciclopirox (PENLAC) 8 % solution; " Apply to adjacent skin and nail daily and remove every 7 days with alcohol pad and reapply again.  Dispense: 6.6 mL; Refill: 2       Discussed with patient possible alternative diagnoses, patient is to take all medications as prescribed.     If symptoms persist FU w/PCP, if symptoms worsen go to emergency room.     If experiencing any side effects from prescribed medications reports to the office immediately or go to emergency room.    Reviewed indication, dosage, usage and potential adverse effects of prescribed medications.     Reviewed risks and benefits of treatment plan. Patient verbalizes understanding of all instruction and verbally agrees to plan.      Return in about 12 weeks (around 6/14/2022) for refills on Methylphenidate.    My total time spent caring for the patient on the day of the encounter was 30 minutes.   This does not include time spent on separately billable procedures/tests.     Please note that this dictation was created using voice recognition software. I have made every reasonable attempt to correct obvious errors, but I expect that there are errors of grammar and possibly content that I did not discover before finalizing the note.

## 2022-03-23 DIAGNOSIS — U07.1 COVID: ICD-10-CM

## 2022-03-23 LAB
COVID ORDER STATUS COVID19: NORMAL
SARS-COV-2 RNA RESP QL NAA+PROBE: NOTDETECTED
SPECIMEN SOURCE: NORMAL

## 2022-05-03 ENCOUNTER — HOSPITAL ENCOUNTER (OUTPATIENT)
Dept: RADIOLOGY | Facility: MEDICAL CENTER | Age: 62
End: 2022-05-03
Attending: NURSE PRACTITIONER
Payer: COMMERCIAL

## 2022-05-03 DIAGNOSIS — E78.2 MIXED HYPERLIPIDEMIA: ICD-10-CM

## 2022-05-03 DIAGNOSIS — Z91.89 OTHER SPECIFIED PERSONAL RISK FACTORS, NOT ELSEWHERE CLASSIFIED: ICD-10-CM

## 2022-05-03 PROCEDURE — 4410556 CT-CARDIAC SCORING (SELF PAY ONLY)

## 2022-06-30 DIAGNOSIS — F90.0 ATTENTION DEFICIT HYPERACTIVITY DISORDER (ADHD), PREDOMINANTLY INATTENTIVE TYPE: ICD-10-CM

## 2022-06-30 RX ORDER — METHYLPHENIDATE HYDROCHLORIDE 10 MG/1
10 TABLET ORAL 2 TIMES DAILY
Qty: 60 TABLET | Refills: 0 | OUTPATIENT
Start: 2022-06-30 | End: 2022-07-30

## 2022-07-07 ENCOUNTER — OFFICE VISIT (OUTPATIENT)
Dept: MEDICAL GROUP | Facility: PHYSICIAN GROUP | Age: 62
End: 2022-07-07
Payer: COMMERCIAL

## 2022-07-07 VITALS
DIASTOLIC BLOOD PRESSURE: 62 MMHG | HEIGHT: 71 IN | TEMPERATURE: 96.9 F | SYSTOLIC BLOOD PRESSURE: 110 MMHG | BODY MASS INDEX: 23.04 KG/M2 | OXYGEN SATURATION: 97 % | WEIGHT: 164.6 LBS | HEART RATE: 77 BPM

## 2022-07-07 DIAGNOSIS — F90.0 ATTENTION DEFICIT HYPERACTIVITY DISORDER (ADHD), PREDOMINANTLY INATTENTIVE TYPE: ICD-10-CM

## 2022-07-07 DIAGNOSIS — E78.2 MIXED HYPERLIPIDEMIA: ICD-10-CM

## 2022-07-07 DIAGNOSIS — R14.0 ABDOMINAL BLOATING: ICD-10-CM

## 2022-07-07 DIAGNOSIS — Z00.00 PHYSICAL EXAM, ANNUAL: ICD-10-CM

## 2022-07-07 PROCEDURE — 99214 OFFICE O/P EST MOD 30 MIN: CPT | Performed by: NURSE PRACTITIONER

## 2022-07-07 RX ORDER — SIMETHICONE 80 MG
80 TABLET,CHEWABLE ORAL EVERY 6 HOURS PRN
Qty: 30 TABLET | Refills: 0 | Status: SHIPPED | OUTPATIENT
Start: 2022-07-07 | End: 2023-04-20

## 2022-07-07 RX ORDER — METHYLPHENIDATE HYDROCHLORIDE 10 MG/1
10 TABLET ORAL 2 TIMES DAILY
Qty: 60 TABLET | Refills: 0 | Status: SHIPPED | OUTPATIENT
Start: 2022-08-07 | End: 2022-08-09

## 2022-07-07 RX ORDER — METHYLPHENIDATE HYDROCHLORIDE 10 MG/1
10 TABLET ORAL 2 TIMES DAILY
Qty: 60 TABLET | Refills: 0 | Status: SHIPPED | OUTPATIENT
Start: 2022-09-07 | End: 2022-10-07

## 2022-07-07 RX ORDER — LATANOPROST 50 UG/ML
SOLUTION/ DROPS OPHTHALMIC
COMMUNITY
Start: 2022-04-26

## 2022-07-07 RX ORDER — METHYLPHENIDATE HYDROCHLORIDE 10 MG/1
10 TABLET ORAL 2 TIMES DAILY
Qty: 60 TABLET | Refills: 0 | Status: SHIPPED | OUTPATIENT
Start: 2022-07-07 | End: 2022-08-06

## 2022-07-07 ASSESSMENT — FIBROSIS 4 INDEX: FIB4 SCORE: 1.47

## 2022-07-07 NOTE — PROGRESS NOTES
CC:  Chief Complaint   Patient presents with   • GI Problem     X3wks gas, pressure    • Results     CT   • Medication Refill     methylphenidate (RITALIN) 10 MG Tab        HISTORY OF PRESENT ILLNESS: Patient is a 62 y.o. male established patient presenting for follow up     Attention deficit hyperactivity disorder (ADHD), predominantly inattentive type  Chronic and stable condition   States when last seen in March medication was never at pharmacy  States she kept forgetting to call  New refills sent  PDMP reviewed  Will call back to office is prescription is not there      Mixed hyperlipidemia  Chronic and exacerbated condition   Last labs completed in August 2021 show elevated cholesterol, triglycerides, LDL and low HDL  Not on any medication for this  The 10-year ASCVD risk score (Melvinestella HESS Jr., et al., 2013) is: 11.8%  CT cardiac score low at 3.1  Family history of HTN ad stroke  Denies CP, SOB    Abdominal bloating  undiagnosed new condition with uncertain prognosis  Onset 3 weeks ago  History in past of abd bloating due to ice cream  states similar feelings but has not been eating ice cream  Bloating and increase in flattulence  Denies n/v/f/c, diarrhea, consitipation, blood in stool, abd distention, change in stool color           Allergies:Grass pollen([id number]-t-swt campbell)    Current Outpatient Medications   Medication Sig Dispense Refill   • latanoprost (XALATAN) 0.005 % Solution      • methylphenidate (RITALIN) 10 MG Tab Take 1 Tablet by mouth 2 times a day for 30 days. 60 Tablet 0   • [START ON 8/7/2022] methylphenidate (RITALIN) 10 MG Tab Take 1 Tablet by mouth 2 times a day for 30 days. 60 Tablet 0   • [START ON 9/7/2022] methylphenidate (RITALIN) 10 MG Tab Take 1 Tablet by mouth 2 times a day for 30 days. 60 Tablet 0   • simethicone (MYLICON) 80 MG Chew Tab Chew 1 Tablet every 6 hours as needed for Flatulence. 30 Tablet 0   • ciclopirox (PENLAC) 8 % solution Apply to adjacent skin and nail daily and remove  "every 7 days with alcohol pad and reapply again. 6.6 mL 2     No current facility-administered medications for this visit.       Social History     Tobacco Use   • Smoking status: Former Smoker     Packs/day: 0.50     Years: 2.00     Pack years: 1.00     Types: Cigarettes     Start date: 1974     Quit date: 1976     Years since quittin.9   • Smokeless tobacco: Never Used   Vaping Use   • Vaping Use: Never used   Substance Use Topics   • Alcohol use: Not Currently   • Drug use: Never     Social History     Social History Narrative   • Not on file       Family History   Problem Relation Age of Onset   • Stroke Mother    • Hypertension Sister    • Stroke Sister    • Psychiatric Illness Brother         suicide        ROS   See HPI      - NOTE: All other systems reviewed and are negative, except as in HPI.      Exam:    /62 (BP Location: Left arm, Patient Position: Sitting, BP Cuff Size: Adult)   Pulse 77   Temp 36.1 °C (96.9 °F) (Temporal)   Ht 1.791 m (5' 10.5\")   Wt 74.7 kg (164 lb 9.6 oz)   SpO2 97%  Body mass index is 23.28 kg/m².    General: Alert, pleasant, NAD  EYES:   PERRL, EOMI, no icterus or pallor.  Conjunctivae and lids normal.   HENT:  Normocephalic.  External ears normal.   Abdomen: Non-distended, soft,  Slight tenderness to palpation to lower abd, no guarding/rebound. Bowel sounds present.  No hepatosplenomegaly.  No hernias.  Musculoskeletal: Gait is normal.  Moves all extremities well.    Extremities: No clubbing, cyanosis or edema noted.   Psych:  Affect/mood is normal, judgement is good, memory is intact, grooming is appropriate.    Assessment/Plan:    1. Attention deficit hyperactivity disorder (ADHD), predominantly inattentive type  - methylphenidate (RITALIN) 10 MG Tab; Take 1 Tablet by mouth 2 times a day for 30 days.  Dispense: 60 Tablet; Refill: 0  - methylphenidate (RITALIN) 10 MG Tab; Take 1 Tablet by mouth 2 times a day for 30 days.  Dispense: 60 Tablet; Refill: " 0  - methylphenidate (RITALIN) 10 MG Tab; Take 1 Tablet by mouth 2 times a day for 30 days.  Dispense: 60 Tablet; Refill: 0    2. Physical exam, annual  - Comp Metabolic Panel; Future  - CBC WITHOUT DIFFERENTIAL; Future  - Lipid Profile; Future    3. Mixed hyperlipidemia  - CBC WITHOUT DIFFERENTIAL; Future  - Lipid Profile; Future    4. Abdominal bloating  - simethicone (MYLICON) 80 MG Chew Tab; Chew 1 Tablet every 6 hours as needed for Flatulence.  Dispense: 30 Tablet; Refill: 0  Patient will also work on food illumination and provided patient with FODMAP to see if this is beneficial for him.     Discussed with patient possible alternative diagnoses, patient is to take all medications as prescribed.     If symptoms persist FU w/PCP, if symptoms worsen go to emergency room.     If experiencing any side effects from prescribed medications reports to the office immediately or go to emergency room.    Reviewed indication, dosage, usage and potential adverse effects of prescribed medications.     Reviewed risks and benefits of treatment plan. Patient verbalizes understanding of all instruction and verbally agrees to plan.      Return in about 3 weeks (around 7/28/2022) for review labs and bloating.     Please note that this dictation was created using voice recognition software. I have made every reasonable attempt to correct obvious errors, but I expect that there are errors of grammar and possibly content that I did not discover before finalizing the note.

## 2022-07-07 NOTE — ASSESSMENT & PLAN NOTE
Chronic and stable condition   States when last seen in March medication was never at pharmacy  States she kept forgetting to call  New refills sent  PDMP reviewed  Will call back to office is prescription is not there

## 2022-07-07 NOTE — ASSESSMENT & PLAN NOTE
undiagnosed new condition with uncertain prognosis  Onset 3 weeks ago  History in past of abd bloating due to ice cream  states similar feelings but has not been eating ice cream  Bloating and increase in flattulence  Denies n/v/f/c, diarrhea, consitipation, blood in stool, abd distention, change in stool color

## 2022-08-04 ENCOUNTER — HOSPITAL ENCOUNTER (OUTPATIENT)
Dept: LAB | Facility: MEDICAL CENTER | Age: 62
End: 2022-08-04
Attending: NURSE PRACTITIONER
Payer: COMMERCIAL

## 2022-08-04 DIAGNOSIS — Z00.00 PHYSICAL EXAM, ANNUAL: ICD-10-CM

## 2022-08-04 DIAGNOSIS — E78.2 MIXED HYPERLIPIDEMIA: ICD-10-CM

## 2022-08-04 LAB
ERYTHROCYTE [DISTWIDTH] IN BLOOD BY AUTOMATED COUNT: 43.3 FL (ref 35.9–50)
HCT VFR BLD AUTO: 41.5 % (ref 42–52)
HGB BLD-MCNC: 14.2 G/DL (ref 14–18)
MCH RBC QN AUTO: 30.1 PG (ref 27–33)
MCHC RBC AUTO-ENTMCNC: 34.2 G/DL (ref 33.7–35.3)
MCV RBC AUTO: 88.1 FL (ref 81.4–97.8)
PLATELET # BLD AUTO: 216 K/UL (ref 164–446)
PMV BLD AUTO: 9.9 FL (ref 9–12.9)
RBC # BLD AUTO: 4.71 M/UL (ref 4.7–6.1)
WBC # BLD AUTO: 5.1 K/UL (ref 4.8–10.8)

## 2022-08-04 PROCEDURE — 80061 LIPID PANEL: CPT

## 2022-08-04 PROCEDURE — 80053 COMPREHEN METABOLIC PANEL: CPT

## 2022-08-04 PROCEDURE — 36415 COLL VENOUS BLD VENIPUNCTURE: CPT

## 2022-08-04 PROCEDURE — 85027 COMPLETE CBC AUTOMATED: CPT

## 2022-08-05 LAB
ALBUMIN SERPL BCP-MCNC: 4.3 G/DL (ref 3.2–4.9)
ALBUMIN/GLOB SERPL: 1.6 G/DL
ALP SERPL-CCNC: 71 U/L (ref 30–99)
ALT SERPL-CCNC: 26 U/L (ref 2–50)
ANION GAP SERPL CALC-SCNC: 10 MMOL/L (ref 7–16)
AST SERPL-CCNC: 30 U/L (ref 12–45)
BILIRUB SERPL-MCNC: 0.8 MG/DL (ref 0.1–1.5)
BUN SERPL-MCNC: 12 MG/DL (ref 8–22)
CALCIUM SERPL-MCNC: 8.9 MG/DL (ref 8.5–10.5)
CHLORIDE SERPL-SCNC: 107 MMOL/L (ref 96–112)
CHOLEST SERPL-MCNC: 205 MG/DL (ref 100–199)
CO2 SERPL-SCNC: 22 MMOL/L (ref 20–33)
CREAT SERPL-MCNC: 0.98 MG/DL (ref 0.5–1.4)
FASTING STATUS PATIENT QL REPORTED: NORMAL
GFR SERPLBLD CREATININE-BSD FMLA CKD-EPI: 86 ML/MIN/1.73 M 2
GLOBULIN SER CALC-MCNC: 2.7 G/DL (ref 1.9–3.5)
GLUCOSE SERPL-MCNC: 95 MG/DL (ref 65–99)
HDLC SERPL-MCNC: 33 MG/DL
LDLC SERPL CALC-MCNC: 128 MG/DL
POTASSIUM SERPL-SCNC: 4.1 MMOL/L (ref 3.6–5.5)
PROT SERPL-MCNC: 7 G/DL (ref 6–8.2)
SODIUM SERPL-SCNC: 139 MMOL/L (ref 135–145)
TRIGL SERPL-MCNC: 220 MG/DL (ref 0–149)

## 2022-08-09 ENCOUNTER — OFFICE VISIT (OUTPATIENT)
Dept: MEDICAL GROUP | Facility: PHYSICIAN GROUP | Age: 62
End: 2022-08-09
Payer: COMMERCIAL

## 2022-08-09 VITALS
BODY MASS INDEX: 22.23 KG/M2 | HEIGHT: 71 IN | DIASTOLIC BLOOD PRESSURE: 74 MMHG | OXYGEN SATURATION: 97 % | SYSTOLIC BLOOD PRESSURE: 124 MMHG | TEMPERATURE: 98.2 F | HEART RATE: 83 BPM | RESPIRATION RATE: 12 BRPM | WEIGHT: 158.8 LBS

## 2022-08-09 DIAGNOSIS — R14.0 ABDOMINAL BLOATING: ICD-10-CM

## 2022-08-09 DIAGNOSIS — R14.0 GASSINESS: ICD-10-CM

## 2022-08-09 DIAGNOSIS — E78.2 MIXED HYPERLIPIDEMIA: ICD-10-CM

## 2022-08-09 PROCEDURE — 99214 OFFICE O/P EST MOD 30 MIN: CPT | Performed by: NURSE PRACTITIONER

## 2022-08-09 ASSESSMENT — FIBROSIS 4 INDEX: FIB4 SCORE: 1.74

## 2022-08-10 ENCOUNTER — HOSPITAL ENCOUNTER (OUTPATIENT)
Dept: LAB | Facility: MEDICAL CENTER | Age: 62
End: 2022-08-10
Attending: NURSE PRACTITIONER
Payer: COMMERCIAL

## 2022-08-10 DIAGNOSIS — R14.0 ABDOMINAL BLOATING: ICD-10-CM

## 2022-08-10 DIAGNOSIS — R14.0 GASSINESS: ICD-10-CM

## 2022-08-10 LAB — VIT B12 SERPL-MCNC: 637 PG/ML (ref 211–911)

## 2022-08-10 PROCEDURE — 82784 ASSAY IGA/IGD/IGG/IGM EACH: CPT

## 2022-08-10 PROCEDURE — 36415 COLL VENOUS BLD VENIPUNCTURE: CPT

## 2022-08-10 PROCEDURE — 83013 H PYLORI (C-13) BREATH: CPT

## 2022-08-10 PROCEDURE — 86364 TISS TRNSGLTMNASE EA IG CLAS: CPT

## 2022-08-10 PROCEDURE — 82607 VITAMIN B-12: CPT

## 2022-08-12 LAB
IGA SERPL-MCNC: 224 MG/DL (ref 68–408)
TTG IGA SER IA-ACNC: <2 U/ML (ref 0–3)
UREA BREATH TEST QL: NEGATIVE

## 2022-08-12 NOTE — ASSESSMENT & PLAN NOTE
Chronic and stable condition.  Patient continues to have discomfort, increase in gas.  Originally this started back in June and patient did attempt to change his diet intake and noticed some improvement.  Over the last week he has noticed an increase in cramping, increase in flatulence as well as lightheadedness at times.  He states associated nausea as well as some weight loss last week however feels that the weight has come back now.  Patient was also started on simethicone however does not feel that this was beneficial for him.  Denies any  vomiting, fevers, chills,

## 2022-08-12 NOTE — ASSESSMENT & PLAN NOTE
Chronic and exacerbated condition  Patient not currently on any medication at this time  ASCVD risk factor 11.8% patient does have a history of hypertension, stroke   Latest Reference Range & Units 8/4/22 09:24   Cholesterol,Tot 100 - 199 mg/dL 205 (H)   Triglycerides 0 - 149 mg/dL 220 (H)   HDL >=40 mg/dL 33 !   LDL <100 mg/dL 128 (H)

## 2022-08-12 NOTE — PROGRESS NOTES
CC:  Chief Complaint   Patient presents with    GI Problem     X1wk Cramp, Gas, light headed    Lab Results       HISTORY OF PRESENT ILLNESS: Patient is a 62 y.o. male established patient presenting follow-up on lab results and his abdominal cramping, increasing gas and lightheadedness.    Abdominal bloating  Chronic and stable condition.  Patient continues to have discomfort, increase in gas.  Originally this started back in June and patient did attempt to change his diet intake and noticed some improvement.  Over the last week he has noticed an increase in cramping, increase in flatulence as well as lightheadedness at times.  He states associated nausea as well as some weight loss last week however feels that the weight has come back now.  Patient was also started on simethicone however does not feel that this was beneficial for him.  Denies any  vomiting, fevers, chills,    Mixed hyperlipidemia  Chronic and exacerbated condition  Patient not currently on any medication at this time  ASCVD risk factor 11.8% patient does have a history of hypertension, stroke   Latest Reference Range & Units 8/4/22 09:24   Cholesterol,Tot 100 - 199 mg/dL 205 (H)   Triglycerides 0 - 149 mg/dL 220 (H)   HDL >=40 mg/dL 33 !   LDL <100 mg/dL 128 (H)          Allergies:Grass pollen(k-o-r-t-swt campbell)    Current Outpatient Medications   Medication Sig Dispense Refill    latanoprost (XALATAN) 0.005 % Solution       [START ON 9/7/2022] methylphenidate (RITALIN) 10 MG Tab Take 1 Tablet by mouth 2 times a day for 30 days. 60 Tablet 0    simethicone (MYLICON) 80 MG Chew Tab Chew 1 Tablet every 6 hours as needed for Flatulence. 30 Tablet 0     No current facility-administered medications for this visit.     Past Medical History:   Diagnosis Date    Abdominal discomfort in left flank 3/31/2020    ADHD     Allergy     Bloating 6/9/2020    H. pylori infection     Low testosterone in male      Past Surgical History:   Procedure Laterality Date     "APPENDECTOMY       Social History     Tobacco Use    Smoking status: Former     Packs/day: 0.50     Years: 2.00     Pack years: 1.00     Types: Cigarettes     Start date: 1974     Quit date: 1976     Years since quittin.0    Smokeless tobacco: Never   Vaping Use    Vaping Use: Never used   Substance Use Topics    Alcohol use: Not Currently    Drug use: Never     Social History     Social History Narrative    Not on file       Family History   Problem Relation Age of Onset    Stroke Mother     Hypertension Sister     Stroke Sister     Psychiatric Illness Brother         suicide        ROS  See HPI      - NOTE: All other systems reviewed and are negative, except as in HPI.      Exam:    /74 (BP Location: Left arm, Patient Position: Standing, BP Cuff Size: Adult)   Pulse 83   Temp 36.8 °C (98.2 °F) (Temporal)   Resp 12   Ht 1.791 m (5' 10.5\")   Wt 72 kg (158 lb 12.8 oz)   SpO2 97%  Body mass index is 22.46 kg/m².    General: Alert, pleasant, NAD  EYES:   PERRL, EOMI, no icterus or pallor.  Conjunctivae and lids normal.   HENT:  Normocephalic.  External ears normal.   Heart: Regular rate and rhythm.  S1 and S2 normal.  No murmurs appreciated.  Respiratory: Normal respiratory effort.  Clear to auscultation bilaterally.  Abdomen: Non-distended, soft, non-tender, no guarding/rebound.  Hyperactive bowel sounds present.  No hepatosplenomegaly.  No hernias.  Skin: Warm, dry, no rashes.  Musculoskeletal: Gait is normal.  Moves all extremities well.    Extremities: No clubbing, cyanosis or edema noted.   Neurological: No tremors, sensation grossly intact, tone/strength normal, gait is normal.  Psych:  Affect/mood is normal, judgement is good, memory is intact, grooming is appropriate.      LABS: 2022: Results reviewed and discussed with the patient, questions answered.    Assessment/Plan:  1. Abdominal bloating  - H. PYLORI, UREA BREATH TEST, ADULT; Future  - CELIAC DISEASE AB PANEL; Future  - " Referral to Gastroenterology  - VITAMIN B12; Future    2. Gassiness  - H. PYLORI, UREA BREATH TEST, ADULT; Future  - CELIAC DISEASE AB PANEL; Future  - Referral to Gastroenterology  - VITAMIN B12; Future     3. Mixed hyperlipidemia  We will follow-up at next visit on 8/16/2022 to discuss possible statin         Discussed with patient possible alternative diagnoses, patient is to take all medications as prescribed.     If symptoms persist FU w/PCP, if symptoms worsen go to emergency room.     If experiencing any side effects from prescribed medications reports to the office immediately or go to emergency room.    Reviewed indication, dosage, usage and potential adverse effects of prescribed medications.     Reviewed risks and benefits of treatment plan. Patient verbalizes understanding of all instruction and verbally agrees to plan.      Return in about 1 week (around 8/16/2022) for Follow-up labs, stool test.     Please note that this dictation was created using voice recognition software. I have made every reasonable attempt to correct obvious errors, but I expect that there are errors of grammar and possibly content that I did not discover before finalizing the note.

## 2022-08-16 ENCOUNTER — OFFICE VISIT (OUTPATIENT)
Dept: MEDICAL GROUP | Facility: PHYSICIAN GROUP | Age: 62
End: 2022-08-16
Payer: COMMERCIAL

## 2022-08-16 VITALS
SYSTOLIC BLOOD PRESSURE: 108 MMHG | HEIGHT: 71 IN | OXYGEN SATURATION: 96 % | RESPIRATION RATE: 12 BRPM | HEART RATE: 77 BPM | DIASTOLIC BLOOD PRESSURE: 70 MMHG | BODY MASS INDEX: 22.12 KG/M2 | TEMPERATURE: 98.1 F | WEIGHT: 158 LBS

## 2022-08-16 DIAGNOSIS — Z12.11 SCREENING FOR COLORECTAL CANCER: ICD-10-CM

## 2022-08-16 DIAGNOSIS — R42 LIGHTHEADED: ICD-10-CM

## 2022-08-16 DIAGNOSIS — Z12.12 SCREENING FOR COLORECTAL CANCER: ICD-10-CM

## 2022-08-16 DIAGNOSIS — R14.0 ABDOMINAL BLOATING: ICD-10-CM

## 2022-08-16 PROCEDURE — 99214 OFFICE O/P EST MOD 30 MIN: CPT | Performed by: NURSE PRACTITIONER

## 2022-08-16 RX ORDER — MECLIZINE HCL 12.5 MG/1
12.5 TABLET ORAL 3 TIMES DAILY PRN
Qty: 30 TABLET | Refills: 0 | Status: SHIPPED | OUTPATIENT
Start: 2022-08-16 | End: 2023-04-20

## 2022-08-16 ASSESSMENT — FIBROSIS 4 INDEX: FIB4 SCORE: 1.74

## 2022-08-16 NOTE — ASSESSMENT & PLAN NOTE
Chronic and stable condition  Patient notes improvement over the last 2 weeks  Labs completed on  8/10/2022 were negative for H. Pylori, giardia, celiac.   Still pending referral for GI which he has next month  Denies any blood in his stool, abdominal pain

## 2022-08-16 NOTE — PROGRESS NOTES
CC:  Chief Complaint   Patient presents with    Lab Results       HISTORY OF PRESENT ILLNESS: Patient is a 62 y.o. male established patient presenting follow-up labs, dizziness sensation    Lightheaded  Chronic and stable condition.    He notes that he has some improvement   He states is difficult to specifically determine what exactly is occurring.  He notes that there is no dizziness sensation in the morning it lasts roughly 1 to 1-1/2 hours it is unknown if this occurs after he eats or not.  We did complete orthostatics at his last appointment which were stable he does say 1 day he noticed a tinnitus sensation and states that his wife believes that he is hard of hearing.  Denies palpitaions, SOB, CP, imbalance issues, room spinning    Abdominal bloating  Chronic and stable condition  Patient notes improvement over the last 2 weeks  Labs completed on  8/10/2022 were negative for H. Pylori, giardia, celiac.   Still pending referral for GI which he has next month  Denies any blood in his stool, abdominal pain       Allergies:Grass pollen(k-o-r-t-swt campbell)    Current Outpatient Medications   Medication Sig Dispense Refill    meclizine (ANTIVERT) 12.5 MG Tab Take 1 Tablet by mouth 3 times a day as needed for Dizziness. 30 Tablet 0    latanoprost (XALATAN) 0.005 % Solution       [START ON 9/7/2022] methylphenidate (RITALIN) 10 MG Tab Take 1 Tablet by mouth 2 times a day for 30 days. 60 Tablet 0    simethicone (MYLICON) 80 MG Chew Tab Chew 1 Tablet every 6 hours as needed for Flatulence. 30 Tablet 0     No current facility-administered medications for this visit.     Past Medical History:   Diagnosis Date    Abdominal discomfort in left flank 3/31/2020    ADHD     Allergy     Bloating 6/9/2020    H. pylori infection     Low testosterone in male      Past Surgical History:   Procedure Laterality Date    APPENDECTOMY       Social History     Tobacco Use    Smoking status: Former     Packs/day: 0.50     Years: 2.00      "Pack years: 1.00     Types: Cigarettes     Start date: 1974     Quit date: 1976     Years since quittin.0    Smokeless tobacco: Never   Vaping Use    Vaping Use: Never used   Substance Use Topics    Alcohol use: Not Currently    Drug use: Never     Social History     Social History Narrative    Not on file       Family History   Problem Relation Age of Onset    Stroke Mother     Hypertension Sister     Stroke Sister     Psychiatric Illness Brother         suicide        ROS  See HPI      - NOTE: All other systems reviewed and are negative, except as in HPI.      Exam:    /70 (BP Location: Right arm, Patient Position: Sitting, BP Cuff Size: Adult)   Pulse 77   Temp 36.7 °C (98.1 °F) (Temporal)   Resp 12   Ht 1.791 m (5' 10.5\")   Wt 71.7 kg (158 lb)   SpO2 96%  Body mass index is 22.35 kg/m².    General: Alert, pleasant, NAD  EYES:   PERRL, EOMI, no icterus or pallor.  Conjunctivae and lids normal.   HENT:  Normocephalic.  External ears normal. Skin: Warm, dry, no rashes.  Musculoskeletal: Gait is normal.  Moves all extremities well.    Extremities: No clubbing, cyanosis or edema noted.   Neurological: No tremors, sensation grossly intact, tone/strength normal, gait is normal.  Psych:  Affect/mood is normal, judgement is good, memory is intact, grooming is appropriate.      LABS: 8/10/2022: Results reviewed and discussed with the patient, questions answered.    Assessment/Plan:  1. Lightheaded  - Referral to Physical Therapy  - meclizine (ANTIVERT) 12.5 MG Tab; Take 1 Tablet by mouth 3 times a day as needed for Dizziness.  Dispense: 30 Tablet; Refill: 0  We will reevaluate in 3 weeks  Patient to also complete a journal regarding his lightheadedness    2. Abdominal bloating  Continue to follow-up with [facility]    3. Screening for colorectal cancer  - Referral to GI for Colonoscopy     Discussed with patient possible alternative diagnoses, patient is to take all medications as prescribed.     If " symptoms persist FU w/PCP, if symptoms worsen go to emergency room.     If experiencing any side effects from prescribed medications reports to the office immediately or go to emergency room.    Reviewed indication, dosage, usage and potential adverse effects of prescribed medications.     Reviewed risks and benefits of treatment plan. Patient verbalizes understanding of all instruction and verbally agrees to plan.      Return in about 3 weeks (around 9/6/2022) for follow up meclizine.      Please note that this dictation was created using voice recognition software. I have made every reasonable attempt to correct obvious errors, but I expect that there are errors of grammar and possibly content that I did not discover before finalizing the note.

## 2022-08-16 NOTE — ASSESSMENT & PLAN NOTE
Chronic and stable condition.    He notes that he has some improvement   He states is difficult to specifically determine what exactly is occurring.  He notes that there is no dizziness sensation in the morning it lasts roughly 1 to 1-1/2 hours it is unknown if this occurs after he eats or not.  We did complete orthostatics at his last appointment which were stable he does say 1 day he noticed a tinnitus sensation and states that his wife believes that he is hard of hearing.  Denies palpitaions, SOB, CP, imbalance issues, room spinning

## 2022-08-25 DIAGNOSIS — R14.0 ABDOMINAL BLOATING: ICD-10-CM

## 2022-08-30 ENCOUNTER — HOSPITAL ENCOUNTER (OUTPATIENT)
Facility: MEDICAL CENTER | Age: 62
End: 2022-08-30
Attending: NURSE PRACTITIONER
Payer: COMMERCIAL

## 2022-08-30 DIAGNOSIS — R14.0 ABDOMINAL BLOATING: ICD-10-CM

## 2022-08-30 PROCEDURE — 87045 FECES CULTURE AEROBIC BACT: CPT

## 2022-08-30 PROCEDURE — 87209 SMEAR COMPLEX STAIN: CPT

## 2022-08-30 PROCEDURE — 87177 OVA AND PARASITES SMEARS: CPT

## 2022-08-30 PROCEDURE — 87899 AGENT NOS ASSAY W/OPTIC: CPT | Mod: 91

## 2022-08-31 LAB
E COLI SXT1+2 STL IA: NORMAL
SIGNIFICANT IND 70042: NORMAL
SITE SITE: NORMAL
SOURCE SOURCE: NORMAL

## 2022-09-01 LAB
BACTERIA STL CULT: NORMAL
C JEJUNI+C COLI AG STL QL: NORMAL
E COLI SXT1+2 STL IA: NORMAL
SIGNIFICANT IND 70042: NORMAL
SITE SITE: NORMAL
SOURCE SOURCE: NORMAL

## 2022-09-07 ENCOUNTER — OFFICE VISIT (OUTPATIENT)
Dept: MEDICAL GROUP | Facility: PHYSICIAN GROUP | Age: 62
End: 2022-09-07
Payer: COMMERCIAL

## 2022-09-07 VITALS
HEIGHT: 70 IN | BODY MASS INDEX: 23.16 KG/M2 | OXYGEN SATURATION: 95 % | SYSTOLIC BLOOD PRESSURE: 110 MMHG | HEART RATE: 70 BPM | WEIGHT: 161.8 LBS | TEMPERATURE: 97.5 F | RESPIRATION RATE: 16 BRPM | DIASTOLIC BLOOD PRESSURE: 70 MMHG

## 2022-09-07 DIAGNOSIS — R14.0 ABDOMINAL DISTENSION (GASEOUS): ICD-10-CM

## 2022-09-07 DIAGNOSIS — R14.0 ABDOMINAL BLOATING: ICD-10-CM

## 2022-09-07 DIAGNOSIS — R10.30 LOWER ABDOMINAL PAIN: ICD-10-CM

## 2022-09-07 PROCEDURE — 99214 OFFICE O/P EST MOD 30 MIN: CPT | Performed by: NURSE PRACTITIONER

## 2022-09-07 RX ORDER — CIPROFLOXACIN 500 MG/1
500 TABLET, FILM COATED ORAL 2 TIMES DAILY
Qty: 10 TABLET | Refills: 0 | Status: SHIPPED | OUTPATIENT
Start: 2022-09-07 | End: 2023-04-20

## 2022-09-07 ASSESSMENT — FIBROSIS 4 INDEX: FIB4 SCORE: 1.74

## 2022-09-08 NOTE — PROGRESS NOTES
CC:  Chief Complaint   Patient presents with    Follow-Up     Bloated, states unable to sleep at night, mylicon not working        HISTORY OF PRESENT ILLNESS: Patient is a 62 y.o. male established patient presenting for continued work-up on abdominal bloating and gas    Abdominal bloating  Chronic and stable condition.  Patient continues to have some abdominal bloating which is worse at night as well as gas.  It has been causing him issues with sleeping.  He has cut out all dairy and has tried medications only previously prescribed with no improvement.  He notes that the next gastroenterology appointment he can get into is not until December out in [place].  Denies blood in stool, nausea, vomiting, chills, fevers       Allergies:Grass pollen(k-o-r-t-swt campbell)    Current Outpatient Medications   Medication Sig Dispense Refill    ciprofloxacin (CIPRO) 500 MG Tab Take 1 Tablet by mouth 2 times a day. 10 Tablet 0    latanoprost (XALATAN) 0.005 % Solution       methylphenidate (RITALIN) 10 MG Tab Take 1 Tablet by mouth 2 times a day for 30 days. 60 Tablet 0    simethicone (MYLICON) 80 MG Chew Tab Chew 1 Tablet every 6 hours as needed for Flatulence. 30 Tablet 0    meclizine (ANTIVERT) 12.5 MG Tab Take 1 Tablet by mouth 3 times a day as needed for Dizziness. (Patient not taking: Reported on 2022) 30 Tablet 0     No current facility-administered medications for this visit.     Past Medical History:   Diagnosis Date    Abdominal discomfort in left flank 3/31/2020    ADHD     Allergy     Bloating 2020    H. pylori infection     Low testosterone in male      Past Surgical History:   Procedure Laterality Date    APPENDECTOMY       Social History     Tobacco Use    Smoking status: Former     Packs/day: 0.50     Years: 2.00     Pack years: 1.00     Types: Cigarettes     Start date: 1974     Quit date: 1976     Years since quittin.0    Smokeless tobacco: Never   Vaping Use    Vaping Use: Never used  "  Substance Use Topics    Alcohol use: Not Currently    Drug use: Never     Social History     Social History Narrative    Not on file       Family History   Problem Relation Age of Onset    Stroke Mother     Hypertension Sister     Stroke Sister     Psychiatric Illness Brother         suicide        ROS  See HPI      - NOTE: All other systems reviewed and are negative, except as in HPI.      Exam:    /70   Pulse 70   Temp 36.4 °C (97.5 °F) (Temporal)   Resp 16   Ht 1.778 m (5' 10\")   Wt 73.4 kg (161 lb 12.8 oz)   SpO2 95%  Body mass index is 23.22 kg/m².    General: Alert, pleasant, NAD  EYES:   PERRL, EOMI, no icterus or pallor.  Conjunctivae and lids normal.   HENT:  Normocephalic.  External ears normal.   Heart: Regular rate and rhythm.  S1 and S2 normal.  No murmurs appreciated.  Respiratory: Normal respiratory effort.  Clear to auscultation bilaterally.  Abdomen: Non-distended, soft, lower quadrant tender, no guarding/rebound. Bowel sounds present.  No hepatosplenomegaly.  No hernias.  Skin: Warm, dry, no rashes.  Musculoskeletal: Gait is normal.  Moves all extremities well.    Extremities: No clubbing, cyanosis or edema noted.   Neurological: No tremors, sensation grossly intact, tone/strength normal, gait is normal.  Psych:  Affect/mood is normal, judgement is good, memory is intact, grooming is appropriate.      LABS: 8/30/2022: Results reviewed and discussed with the patient, questions answered.    Assessment/Plan:    1. Abdominal bloating  - ciprofloxacin (CIPRO) 500 MG Tab; Take 1 Tablet by mouth 2 times a day.  Dispense: 10 Tablet; Refill: 0  - CT-ABDOMEN-PELVIS WITH; Future  We will trial Cipro and see if this helps as patient was traveling to [facility] in March.  He was also instructed to call [facility] and to see if any of their offices can get him in early as well as, cancellation list.    2. Abdominal distension (gaseous)  - CT-ABDOMEN-PELVIS WITH; Future    3. Lower " abdominal pain  - CT-ABDOMEN-PELVIS WITH; Future       Discussed with patient possible alternative diagnoses, patient is to take all medications as prescribed.     If symptoms persist FU w/PCP, if symptoms worsen go to emergency room.     If experiencing any side effects from prescribed medications reports to the office immediately or go to emergency room.    Reviewed indication, dosage, usage and potential adverse effects of prescribed medications.     Reviewed risks and benefits of treatment plan. Patient verbalizes understanding of all instruction and verbally agrees to plan.      Return in about 1 week (around 9/14/2022) for Pending medication.      Please note that this dictation was created using voice recognition software. I have made every reasonable attempt to correct obvious errors, but I expect that there are errors of grammar and possibly content that I did not discover before finalizing the note.

## 2022-09-08 NOTE — ASSESSMENT & PLAN NOTE
Chronic and stable condition.  Patient continues to have some abdominal bloating which is worse at night as well as gas.  It has been causing him issues with sleeping.  He has cut out all dairy and has tried medications only previously prescribed with no improvement.  He notes that the next gastroenterology appointment he can get into is not until December out in [place].  Denies blood in stool, nausea, vomiting, chills, fevers

## 2022-09-09 LAB — OVA AND PARASITE, FECAL INTERPRETATION Q0595: NEGATIVE

## 2022-09-27 ENCOUNTER — HOSPITAL ENCOUNTER (OUTPATIENT)
Dept: RADIOLOGY | Facility: MEDICAL CENTER | Age: 62
End: 2022-09-27
Attending: NURSE PRACTITIONER
Payer: COMMERCIAL

## 2022-09-27 DIAGNOSIS — R14.0 ABDOMINAL DISTENSION (GASEOUS): ICD-10-CM

## 2022-09-27 DIAGNOSIS — R14.0 ABDOMINAL BLOATING: ICD-10-CM

## 2022-09-27 DIAGNOSIS — R10.30 LOWER ABDOMINAL PAIN: ICD-10-CM

## 2022-09-27 PROCEDURE — 74177 CT ABD & PELVIS W/CONTRAST: CPT

## 2022-09-27 PROCEDURE — 700117 HCHG RX CONTRAST REV CODE 255: Performed by: NURSE PRACTITIONER

## 2022-09-27 RX ADMIN — IOHEXOL 25 ML: 240 INJECTION, SOLUTION INTRATHECAL; INTRAVASCULAR; INTRAVENOUS; ORAL at 14:25

## 2022-09-27 RX ADMIN — IOHEXOL 100 ML: 350 INJECTION, SOLUTION INTRAVENOUS at 14:25

## 2022-10-06 ENCOUNTER — PATIENT MESSAGE (OUTPATIENT)
Dept: MEDICAL GROUP | Facility: PHYSICIAN GROUP | Age: 62
End: 2022-10-06
Payer: COMMERCIAL

## 2022-10-06 NOTE — TELEPHONE ENCOUNTER
----- Message from ANTONELLA Castle sent at 9/29/2022  5:46 PM PDT -----  Needs follow up appt for results

## 2023-04-20 ENCOUNTER — HOSPITAL ENCOUNTER (OUTPATIENT)
Facility: MEDICAL CENTER | Age: 63
End: 2023-04-20
Attending: NURSE PRACTITIONER
Payer: COMMERCIAL

## 2023-04-20 ENCOUNTER — OFFICE VISIT (OUTPATIENT)
Dept: MEDICAL GROUP | Facility: PHYSICIAN GROUP | Age: 63
End: 2023-04-20
Payer: COMMERCIAL

## 2023-04-20 VITALS
HEIGHT: 71 IN | BODY MASS INDEX: 22.2 KG/M2 | RESPIRATION RATE: 12 BRPM | TEMPERATURE: 97.8 F | HEART RATE: 81 BPM | OXYGEN SATURATION: 99 % | DIASTOLIC BLOOD PRESSURE: 74 MMHG | WEIGHT: 158.6 LBS | SYSTOLIC BLOOD PRESSURE: 124 MMHG

## 2023-04-20 DIAGNOSIS — F90.0 ATTENTION DEFICIT HYPERACTIVITY DISORDER (ADHD), PREDOMINANTLY INATTENTIVE TYPE: ICD-10-CM

## 2023-04-20 DIAGNOSIS — E78.2 MIXED HYPERLIPIDEMIA: ICD-10-CM

## 2023-04-20 PROBLEM — M25.522 LEFT ELBOW PAIN: Status: RESOLVED | Noted: 2021-08-10 | Resolved: 2023-04-20

## 2023-04-20 PROBLEM — R42 LIGHTHEADED: Status: RESOLVED | Noted: 2022-08-16 | Resolved: 2023-04-20

## 2023-04-20 PROCEDURE — G0481 DRUG TEST DEF 8-14 CLASSES: HCPCS

## 2023-04-20 PROCEDURE — 99214 OFFICE O/P EST MOD 30 MIN: CPT | Performed by: NURSE PRACTITIONER

## 2023-04-20 RX ORDER — AMOXICILLIN 500 MG/1
CAPSULE ORAL
COMMUNITY
Start: 2023-03-21 | End: 2023-04-20

## 2023-04-20 RX ORDER — METHYLPHENIDATE HYDROCHLORIDE 5 MG/1
5 TABLET ORAL 2 TIMES DAILY
COMMUNITY
End: 2023-04-20

## 2023-04-20 RX ORDER — IBUPROFEN 800 MG/1
800 TABLET ORAL EVERY 6 HOURS
COMMUNITY
Start: 2023-03-21 | End: 2023-06-30

## 2023-04-20 RX ORDER — METHYLPHENIDATE HYDROCHLORIDE 36 MG/1
36 TABLET ORAL EVERY MORNING
Qty: 30 TABLET | Refills: 0 | Status: SHIPPED | OUTPATIENT
Start: 2023-04-20 | End: 2023-05-18

## 2023-04-20 ASSESSMENT — FIBROSIS 4 INDEX: FIB4 SCORE: 1.77

## 2023-04-20 ASSESSMENT — ENCOUNTER SYMPTOMS
NERVOUS/ANXIOUS: 0
DIZZINESS: 0
INSOMNIA: 0
FEVER: 0
SHORTNESS OF BREATH: 0
CHILLS: 0

## 2023-04-20 ASSESSMENT — PATIENT HEALTH QUESTIONNAIRE - PHQ9: CLINICAL INTERPRETATION OF PHQ2 SCORE: 0

## 2023-04-20 NOTE — PROGRESS NOTES
"CC:  Chief Complaint   Patient presents with    Medication Refill     methylphenidate       HISTORY OF PRESENT ILLNESS: Patient is a 63 y.o. male established patient presenting     Problem   Mixed Hyperlipidemia    Is becoming more active  Notes his knees at starting to bug him a bit however  continues to work on diet     Attention Deficit Hyperactivity Disorder (Adhd), Predominantly Inattentive Type    Patient is currently taking Ritalin 10 mg twice daily.  He states that he would prefer to take something in the morning that lasts all day long.  He does note that the Druze has been controlling his symptoms and helping manage his day.  Patient does need new controlled substance contract, pain management urine testing.   PDMP reviewed     Lightheaded (Resolved)   Left Elbow Pain (Resolved)         Review of Systems   Constitutional:  Negative for chills and fever.   Respiratory:  Negative for shortness of breath.    Cardiovascular:  Negative for chest pain.   Neurological:  Negative for dizziness.   Psychiatric/Behavioral:  The patient is not nervous/anxious and does not have insomnia.            - NOTE: All other systems reviewed and are negative, except as in HPI.      Exam:    /74 (BP Location: Left arm, Patient Position: Sitting, BP Cuff Size: Adult)   Pulse 81   Temp 36.6 °C (97.8 °F) (Temporal)   Resp 12   Ht 1.791 m (5' 10.5\")   Wt 71.9 kg (158 lb 9.6 oz)   SpO2 99%  Body mass index is 22.44 kg/m².    Physical Exam  Constitutional:       Appearance: Normal appearance. He is normal weight.   HENT:      Head: Normocephalic and atraumatic.      Mouth/Throat:      Mouth: Mucous membranes are moist.      Pharynx: Oropharynx is clear.   Eyes:      Extraocular Movements: Extraocular movements intact.      Conjunctiva/sclera: Conjunctivae normal.      Pupils: Pupils are equal, round, and reactive to light.   Pulmonary:      Effort: Pulmonary effort is normal.   Musculoskeletal:         General: Normal " range of motion.      Cervical back: Normal range of motion.   Skin:     General: Skin is warm and dry.   Neurological:      General: No focal deficit present.      Mental Status: He is alert and oriented to person, place, and time. Mental status is at baseline.   Psychiatric:         Mood and Affect: Mood normal.         Behavior: Behavior normal.         Thought Content: Thought content normal.         Judgment: Judgment normal.         Assessment/Plan:    1. Attention deficit hyperactivity disorder (ADHD), predominantly inattentive type  Chronic and stable condition  We will provide patient with 1 month of Concerta and have him follow-up in 3 to 4 weeks to reevaluate  - Controlled Substance Treatment Agreement  - Pain Management Screen; Future  - methylphenidate (CONCERTA) 36 MG CR tablet; Take 1 Tablet by mouth every morning for 30 days.  Dispense: 30 Tablet; Refill: 0    2. Mixed hyperlipidemia  Chronic and stable condition  We will return his lipid panel and to have discussion regarding statins at next appointment in 3 to 4 weeks  - Lipid Profile; Future       Discussed with patient possible alternative diagnoses, patient is to take all medications as prescribed.     If symptoms persist FU w/PCP, if symptoms worsen go to emergency room.     If experiencing any side effects from prescribed medications reports to the office immediately or go to emergency room.    Reviewed indication, dosage, usage and potential adverse effects of prescribed medications.     Reviewed risks and benefits of treatment plan. Patient verbalizes understanding of all instruction and verbally agrees to plan.      Return for Follow-up in 3 to 4 weeks for medication efficacy and lab review.      Please note that this dictation was created using voice recognition software. I have made every reasonable attempt to correct obvious errors, but I expect that there are errors of grammar and possibly content that I did not discover before  finalizing the note.

## 2023-04-20 NOTE — PATIENT INSTRUCTIONS
[facility]  [address] [place], [place] [address]CARLOS nascimento. [id number]  Phone: [phone]  Fax: [phone]

## 2023-04-25 ENCOUNTER — HOSPITAL ENCOUNTER (OUTPATIENT)
Dept: LAB | Facility: MEDICAL CENTER | Age: 63
End: 2023-04-25
Attending: NURSE PRACTITIONER
Payer: COMMERCIAL

## 2023-04-25 DIAGNOSIS — E78.2 MIXED HYPERLIPIDEMIA: ICD-10-CM

## 2023-04-25 LAB
1OH-MIDAZOLAM UR QL SCN: NOT DETECTED
6MAM UR QL: NOT DETECTED
7AMINOCLONAZEPAM UR QL: NOT DETECTED
A-OH ALPRAZ UR QL: NOT DETECTED
ALPRAZ UR QL: NOT DETECTED
AMPHET UR QL SCN: NOT DETECTED
ANNOTATION COMMENT IMP: NORMAL
ANNOTATION COMMENT IMP: NORMAL
BARBITURATES UR QL: NOT DETECTED
BUPRENORPHINE UR QL: NOT DETECTED
BZE UR QL: NOT DETECTED
CARBOXYTHC UR QL: NOT DETECTED
CARISOPRODOL UR QL: NOT DETECTED
CLONAZEPAM UR QL: NOT DETECTED
CODEINE UR QL: NOT DETECTED
DIAZEPAM UR QL: NOT DETECTED
ETHYL GLUCURONIDE UR QL: NOT DETECTED
FENTANYL UR QL: NOT DETECTED
GABAPENTIN UR QL: NOT DETECTED
HYDROCODONE UR QL: NOT DETECTED
HYDROMORPHONE UR QL: NOT DETECTED
LORAZEPAM UR QL: NOT DETECTED
MDA UR QL: NOT DETECTED
MDEA UR QL: NOT DETECTED
MDMA UR QL: NOT DETECTED
MEPERIDINE UR QL: NOT DETECTED
METHADONE UR QL: NOT DETECTED
METHAMPHET UR QL: NOT DETECTED
MIDAZOLAM UR QL SCN: NOT DETECTED
MORPHINE UR QL: NOT DETECTED
NALOXONE UR QL SCN: NOT DETECTED
NORBUPRENORPHINE UR QL CFM: NOT DETECTED
NORDIAZEPAM UR QL: NOT DETECTED
NORFENTANYL UR QL: NOT DETECTED
NORHYDROCODONE UR QL CFM: NOT DETECTED
NOROXYCODONE UR QL CFM: NOT DETECTED
NOROXYMORPH CO100 Q0458: NOT DETECTED
OXAZEPAM UR QL: NOT DETECTED
OXYCODONE UR QL: NOT DETECTED
OXYMORPHONE UR QL: NOT DETECTED
PATHOLOGY STUDY: NORMAL
PCP UR QL: NOT DETECTED
PHENTERMINE UR QL: NOT DETECTED
PPAA UR QL: NOT DETECTED
PREGABALIN UR QL SCN: NOT DETECTED
SERVICE CMNT-IMP: NORMAL
TAPENADOL OSULF CO200 Q0473: NOT DETECTED
TAPENTADOL UR QL SCN: NOT DETECTED
TEMAZEPAM UR QL: NOT DETECTED
TRAMADOL UR QL: NOT DETECTED
ZOLPIDEM PHENYL-4-CARB UR QL SCN: NOT DETECTED
ZOLPIDEM UR QL: NOT DETECTED

## 2023-04-25 PROCEDURE — 80061 LIPID PANEL: CPT

## 2023-04-25 PROCEDURE — 36415 COLL VENOUS BLD VENIPUNCTURE: CPT

## 2023-04-26 LAB
CHOLEST SERPL-MCNC: 206 MG/DL (ref 100–199)
FASTING STATUS PATIENT QL REPORTED: NORMAL
HDLC SERPL-MCNC: 37 MG/DL
LDLC SERPL CALC-MCNC: 142 MG/DL
TRIGL SERPL-MCNC: 134 MG/DL (ref 0–149)

## 2023-05-03 NOTE — ASSESSMENT & PLAN NOTE
Blood pressure was addressed, pt would like to come in sooner that 5/22 MD is full until then. Please call to advise   Has noticed painful bump on back of right hand, causes discomfort when he opens doors, grasps. Denies injury.

## 2023-05-18 ENCOUNTER — OFFICE VISIT (OUTPATIENT)
Dept: MEDICAL GROUP | Facility: PHYSICIAN GROUP | Age: 63
End: 2023-05-18
Payer: COMMERCIAL

## 2023-05-18 VITALS
HEART RATE: 95 BPM | BODY MASS INDEX: 22.45 KG/M2 | OXYGEN SATURATION: 98 % | DIASTOLIC BLOOD PRESSURE: 74 MMHG | RESPIRATION RATE: 12 BRPM | WEIGHT: 156.8 LBS | TEMPERATURE: 97.9 F | SYSTOLIC BLOOD PRESSURE: 110 MMHG | HEIGHT: 70 IN

## 2023-05-18 DIAGNOSIS — F90.0 ATTENTION DEFICIT HYPERACTIVITY DISORDER (ADHD), PREDOMINANTLY INATTENTIVE TYPE: ICD-10-CM

## 2023-05-18 DIAGNOSIS — Z12.11 ENCOUNTER FOR COLORECTAL CANCER SCREENING: ICD-10-CM

## 2023-05-18 DIAGNOSIS — Z12.12 ENCOUNTER FOR COLORECTAL CANCER SCREENING: ICD-10-CM

## 2023-05-18 PROCEDURE — 99214 OFFICE O/P EST MOD 30 MIN: CPT | Performed by: NURSE PRACTITIONER

## 2023-05-18 PROCEDURE — 3078F DIAST BP <80 MM HG: CPT | Performed by: NURSE PRACTITIONER

## 2023-05-18 PROCEDURE — 3074F SYST BP LT 130 MM HG: CPT | Performed by: NURSE PRACTITIONER

## 2023-05-18 RX ORDER — METHYLPHENIDATE HYDROCHLORIDE 54 MG/1
54 TABLET ORAL EVERY MORNING
Qty: 30 TABLET | Refills: 0 | Status: CANCELLED | OUTPATIENT
Start: 2023-05-18

## 2023-05-18 RX ORDER — METHYLPHENIDATE HYDROCHLORIDE 10 MG/1
10 TABLET ORAL 2 TIMES DAILY
Qty: 180 EACH | Refills: 0 | Status: SHIPPED | OUTPATIENT
Start: 2023-05-18 | End: 2023-08-16

## 2023-05-18 ASSESSMENT — ENCOUNTER SYMPTOMS
HEADACHES: 0
NERVOUS/ANXIOUS: 0
FEVER: 0
CHILLS: 0
DIZZINESS: 0

## 2023-05-18 ASSESSMENT — FIBROSIS 4 INDEX: FIB4 SCORE: 1.77

## 2023-05-18 NOTE — PROGRESS NOTES
"CC:  Chief Complaint   Patient presents with    Medication Follow-up     methylphenidate        HISTORY OF PRESENT ILLNESS: Patient is a 63 y.o. male established patient presenting     Problem   Attention Deficit Hyperactivity Disorder (Adhd), Predominantly Inattentive Type    At our last visit patient was prescribed Concerta 36 mg controlled release to take every morning.  He states that he has been using this but notes that the other day he has a very dry mouth as well as his wife telling him that she does not see any improvement with the dose change.  He is requesting to go back on his previous medication which was 10 mg twice daily Reglan.  PDMP reviewed  Controlled substance contract on file    4/20/2023  Patient is currently taking Ritalin 10 mg twice daily.     would prefer to take something that lasts all day long.               Review of Systems   Constitutional:  Negative for chills and fever.   Neurological:  Negative for dizziness and headaches.   Psychiatric/Behavioral:  The patient is not nervous/anxious.              - NOTE: All other systems reviewed and are negative, except as in HPI.      Exam:    /74 (BP Location: Right arm, Patient Position: Sitting, BP Cuff Size: Adult)   Pulse 95   Temp 36.6 °C (97.9 °F) (Temporal)   Resp 12   Ht 1.778 m (5' 10\")   Wt 71.1 kg (156 lb 12.8 oz)   SpO2 98%  Body mass index is 22.5 kg/m².    Physical Exam  Constitutional:       Appearance: Normal appearance. He is normal weight.   Neurological:      Mental Status: He is alert and oriented to person, place, and time.   Psychiatric:         Mood and Affect: Mood normal.         Behavior: Behavior normal.         Thought Content: Thought content normal.         Judgment: Judgment normal.           Assessment/Plan:    1. Attention deficit hyperactivity disorder (ADHD), predominantly inattentive type  Chronic and stable condition  - methylphenidate (RITALIN) 10 MG Tab; Take 1 Tablet by mouth 2 times a day " for 90 days.  Dispense: 180 Each; Refill: 0    2. Encounter for colorectal cancer screening  - OCCULT BLOOD FECES IMMUNOASSAY; Future       Discussed with patient possible alternative diagnoses, patient is to take all medications as prescribed.     If symptoms persist FU w/PCP, if symptoms worsen go to emergency room.     If experiencing any side effects from prescribed medications reports to the office immediately or go to emergency room.    Reviewed indication, dosage, usage and potential adverse effects of prescribed medications.     Reviewed risks and benefits of treatment plan. Patient verbalizes understanding of all instruction and verbally agrees to plan.      No follow-ups on file.      Please note that this dictation was created using voice recognition software. I have made every reasonable attempt to correct obvious errors, but I expect that there are errors of grammar and possibly content that I did not discover before finalizing the note.

## 2023-06-30 ENCOUNTER — OFFICE VISIT (OUTPATIENT)
Dept: URGENT CARE | Facility: PHYSICIAN GROUP | Age: 63
End: 2023-06-30
Payer: COMMERCIAL

## 2023-06-30 VITALS
BODY MASS INDEX: 21.47 KG/M2 | HEART RATE: 74 BPM | SYSTOLIC BLOOD PRESSURE: 110 MMHG | WEIGHT: 150 LBS | DIASTOLIC BLOOD PRESSURE: 78 MMHG | RESPIRATION RATE: 16 BRPM | TEMPERATURE: 97.2 F | HEIGHT: 70 IN | OXYGEN SATURATION: 99 %

## 2023-06-30 DIAGNOSIS — R14.0 ABDOMINAL BLOATING: ICD-10-CM

## 2023-06-30 DIAGNOSIS — A09 TRAVELERS' DIARRHEA: ICD-10-CM

## 2023-06-30 PROCEDURE — 3078F DIAST BP <80 MM HG: CPT | Performed by: PHYSICIAN ASSISTANT

## 2023-06-30 PROCEDURE — 3074F SYST BP LT 130 MM HG: CPT | Performed by: PHYSICIAN ASSISTANT

## 2023-06-30 PROCEDURE — 99213 OFFICE O/P EST LOW 20 MIN: CPT | Performed by: PHYSICIAN ASSISTANT

## 2023-06-30 RX ORDER — AZITHROMYCIN 250 MG/1
TABLET, FILM COATED ORAL
Qty: 6 TABLET | Refills: 0 | Status: SHIPPED | OUTPATIENT
Start: 2023-06-30 | End: 2024-03-21

## 2023-06-30 ASSESSMENT — ENCOUNTER SYMPTOMS
CONSTIPATION: 0
HEARTBURN: 0
BLOATING: 1
MYALGIAS: 1
FEVER: 1
VOMITING: 0
ABDOMINAL PAIN: 1
NAUSEA: 0
BLOOD IN STOOL: 0
FLATUS: 1
DIARRHEA: 1

## 2023-06-30 ASSESSMENT — FIBROSIS 4 INDEX: FIB4 SCORE: 1.77

## 2023-06-30 NOTE — LETTER
June 30, 2023         Patient: Shashank Farmer   YOB: 1960   Date of Visit: 6/30/2023           To Whom it May Concern:    Shashank Farmer was seen in my clinic on 6/30/2023. He may return to work on 7/5/2023 or sooner if condition improves sooner.   .    If you have any questions or concerns, please don't hesitate to call.        Sincerely,           Maida Gallegos P.A.-C.  Electronically Signed

## 2023-06-30 NOTE — PROGRESS NOTES
Subjective     Shashank Farmer is a 63 y.o. male who presents with Abdominal Pain    PMH:  has a past medical history of Abdominal discomfort in left flank (3/31/2020), ADHD, Allergy, Bloating (6/9/2020), H. pylori infection, and Low testosterone in male.  MEDS:   Current Outpatient Medications:   medication., Disp: 6 Tablet, Rfl: 0    latanoprost (XALATAN) 0.005 % Solution, , Disp: , Rfl:     methylphenidate (RITALIN) 10 MG Tab, Take 1 Tablet by mouth 2 times a day for 90 days. (Patient not taking: Reported on 6/30/2023), Disp: 180 Each, Rfl: 0  ALLERGIES:   Allergies   Allergen Reactions    Grass Pollen(K-O-[id number]-T-Swt Michael) Runny Nose     SURGHX:   Past Surgical History:   Procedure Laterality Date    APPENDECTOMY       SOCHX:  reports that he quit smoking about 46 years ago. His smoking use included cigarettes. He started smoking about 48 years ago. He has a 1.00 pack-year smoking history. He has never used smokeless tobacco. He reports that he does not currently use alcohol. He reports that he does not use drugs.  FH: Reviewed with patient, not pertinent to this visit.           Patient presents with complaint of bloating and diarrhea after returning from vacation in [facility].  Patient states he has had to take antibiotics due to traveler's diarrhea before, feels he would benefit from that at this time as well.  Patient symptoms began with stomach pain, bloating, body aches and a low-grade subjective fever.  Patient states that has resolved but the diarrhea persist.  Patient denies blood in diarrhea, black or tarry stools.  No other complaints.    Diarrhea   This is a new problem. The current episode started in the past 7 days. The problem has been waxing and waning. The stool consistency is described as Watery. The patient states that diarrhea does not awaken him from sleep. Associated symptoms include abdominal pain, bloating, a fever (resolved), increased flatus and myalgias. Pertinent negatives include  "no vomiting. Risk factors include travel to endemic area. He has tried anti-motility drug, electrolyte solution and increased fluids for the symptoms. The treatment provided no relief.       Review of Systems   Constitutional:  Positive for fever (resolved).   Gastrointestinal:  Positive for abdominal pain, bloating, diarrhea and flatus. Negative for blood in stool, constipation, heartburn, melena, nausea and vomiting.   Musculoskeletal:  Positive for myalgias.   All other systems reviewed and are negative.             Objective     /78 (BP Location: Left arm, Patient Position: Sitting, BP Cuff Size: Adult)   Pulse 74   Temp 36.2 °C (97.2 °F)   Resp 16   Ht 1.778 m (5' 10\")   Wt 68 kg (150 lb)   SpO2 99%   BMI 21.52 kg/m²      Physical Exam  Vitals and nursing note reviewed.   Constitutional:       General: He is not in acute distress.     Appearance: Normal appearance. He is well-developed and normal weight. He is not ill-appearing or toxic-appearing.   HENT:      Head: Normocephalic and atraumatic.      Right Ear: Tympanic membrane normal.      Left Ear: Tympanic membrane normal.      Nose: Nose normal.      Mouth/Throat:      Lips: Pink.      Mouth: Mucous membranes are moist.      Pharynx: Oropharynx is clear. Uvula midline.   Eyes:      Extraocular Movements: Extraocular movements intact.      Conjunctiva/sclera: Conjunctivae normal.      Pupils: Pupils are equal, round, and reactive to light.   Cardiovascular:      Rate and Rhythm: Normal rate and regular rhythm.      Pulses: Normal pulses.      Heart sounds: Normal heart sounds.   Pulmonary:      Effort: Pulmonary effort is normal.      Breath sounds: Normal breath sounds.   Abdominal:      General: Bowel sounds are normal.      Palpations: Abdomen is soft.      Tenderness: There is no guarding or rebound.   Musculoskeletal:         General: Normal range of motion.      Cervical back: Normal range of motion and neck supple.   Skin:     General: " Skin is warm and dry.      Capillary Refill: Capillary refill takes less than 2 seconds.   Neurological:      General: No focal deficit present.      Mental Status: He is alert and oriented to person, place, and time.      Cranial Nerves: No cranial nerve deficit.      Motor: Motor function is intact.      Coordination: Coordination is intact.      Gait: Gait normal.   Psychiatric:         Mood and Affect: Mood normal.                             Assessment & Plan           1. Travelers' diarrhea  azithromycin (ZITHROMAX) 250 MG Tab      2. Abdominal bloating              Patient HPI and physical exam are consistent with traveler's diarrhea, patient has just returned from [facility].  I will treat with Zithromax, this is worked for patient in the past.    Patient can continue taking over-the-counter Pepto-Bismol, Imodium as needed.    Patient was instructed to go directly to the emergency department if he noticed any bloody stools, black or tarry stools or hematemesis.    PT to follow clear diet for 8-12 hours, then progress to bland diet for 24 hours, then progress to regular diet as tolerated.     Differential diagnosis, supportive care, and indications for immediate follow-up discussed with patient.  Instructed to return to clinic or nearest emergency department for any change in condition, further concerns, or worsening of symptoms.    I personally reviewed prior external notes and test results pertinent to today's visit.  I have independently reviewed and interpreted all diagnostics ordered during this urgent care visit.    PT should follow up with PCP in 1-2 days for re-evaluation if symptoms have not improved.      Discussed red flags and reasons to return to UC or ED.      Pt and/or family verbalized understanding of diagnosis and follow up instructions and was offered informational handout on diagnosis.  PT discharged.     Please note that this dictation was created using voice recognition software. I have  made every reasonable attempt to correct obvious errors, but I expect that there may be errors of grammar and possibly content that I did not discover before finalizing the note.

## 2024-03-21 ENCOUNTER — APPOINTMENT (OUTPATIENT)
Dept: MEDICAL GROUP | Facility: PHYSICIAN GROUP | Age: 64
End: 2024-03-21
Payer: COMMERCIAL

## 2024-03-21 ENCOUNTER — OFFICE VISIT (OUTPATIENT)
Dept: MEDICAL GROUP | Facility: PHYSICIAN GROUP | Age: 64
End: 2024-03-21
Payer: COMMERCIAL

## 2024-03-21 VITALS
TEMPERATURE: 98.2 F | DIASTOLIC BLOOD PRESSURE: 78 MMHG | BODY MASS INDEX: 23.77 KG/M2 | HEART RATE: 68 BPM | HEIGHT: 70 IN | SYSTOLIC BLOOD PRESSURE: 112 MMHG | OXYGEN SATURATION: 95 % | RESPIRATION RATE: 16 BRPM | WEIGHT: 166 LBS

## 2024-03-21 DIAGNOSIS — M25.512 ACUTE PAIN OF LEFT SHOULDER: ICD-10-CM

## 2024-03-21 DIAGNOSIS — F90.0 ATTENTION DEFICIT HYPERACTIVITY DISORDER (ADHD), PREDOMINANTLY INATTENTIVE TYPE: ICD-10-CM

## 2024-03-21 PROCEDURE — 3078F DIAST BP <80 MM HG: CPT | Performed by: PHYSICIAN ASSISTANT

## 2024-03-21 PROCEDURE — 3074F SYST BP LT 130 MM HG: CPT | Performed by: PHYSICIAN ASSISTANT

## 2024-03-21 PROCEDURE — 99214 OFFICE O/P EST MOD 30 MIN: CPT | Performed by: PHYSICIAN ASSISTANT

## 2024-03-21 RX ORDER — METHYLPHENIDATE HYDROCHLORIDE 36 MG/1
36 TABLET ORAL EVERY MORNING
COMMUNITY
End: 2024-03-21 | Stop reason: SDUPTHER

## 2024-03-21 RX ORDER — METHYLPHENIDATE HYDROCHLORIDE 36 MG/1
36 TABLET ORAL EVERY MORNING
Qty: 30 TABLET | Refills: 0 | Status: SHIPPED | OUTPATIENT
Start: 2024-04-20 | End: 2024-05-20

## 2024-03-21 RX ORDER — METHYLPHENIDATE HYDROCHLORIDE 36 MG/1
36 TABLET ORAL EVERY MORNING
Qty: 30 TABLET | Refills: 0 | Status: SHIPPED | OUTPATIENT
Start: 2024-03-21 | End: 2024-04-20

## 2024-03-21 ASSESSMENT — FIBROSIS 4 INDEX: FIB4 SCORE: 1.8

## 2024-03-21 NOTE — ASSESSMENT & PLAN NOTE
Chronic and exacerbated condition   Last labs completed in August 2021 show elevated cholesterol, triglycerides, LDL and low HDL  Not on any medication for this  The 10-year ASCVD risk score (Melvin HESS Jr., et al., 2013) is: 11.8%  CT cardiac score low at 3.1  Family history of HTN ad stroke  Denies CP, SOB   Received fax from [facility]. PA is needed for Tretinoin 0.025% Cream.     Key: [id number]    Form scanned into chart. Please initiate Prior Authorization.

## 2024-03-22 NOTE — PROGRESS NOTES
"CC:  Chief Complaint   Patient presents with    Medication Refill       HISTORY OF PRESENT ILLNESS: Patient is a 64 y.o. male established patient presenting with issues below  Pt having pain in L shoulder for past six months. No injury. Has full ROM.   Patient requesting refill of Concerta for his ADHD.    Current Outpatient Medications   Medication Sig Dispense Refill    methylphenidate (CONCERTA) 36 MG CR tablet Take 36 mg by mouth every morning.      latanoprost (XALATAN) 0.005 % Solution       azithromycin (ZITHROMAX) 250 MG Tab Take 2 tabs by mouth once today, then one tab by mouth once daily days 2-5.  Complete all medication. 6 Tablet 0     No current facility-administered medications for this visit.        ROS:     ROS    Exam:    /78   Pulse 68   Temp 36.8 °C (98.2 °F) (Temporal)   Resp 16   Ht 1.778 m (5' 10\")   Wt 75.3 kg (166 lb)   SpO2 95%  Body mass index is 23.82 kg/m².    General:  Well nourished, well developed male in NAD  Head is grossly normal.  Neck: Supple.   Skin: Warm and dry. No obvious lesions  Musculoskeletal: Left shoulder has full range of motion.  Neuro: Normal muscle tone. Gait normal. Alert and oriented.  Psych: Normal mood and affect      Please note that this dictation was created using voice recognition software. I have made every reasonable attempt to correct obvious errors, but I expect that there are errors of grammar and possibly content that I did not discover before finalizing the note.        Assessment/Plan:  1. Attention deficit hyperactivity disorder (ADHD), predominantly inattentive type  PDMP checked. Refills of concerta 36mg sent in.   - Controlled Substance Treatment Agreement  - methylphenidate (CONCERTA) 36 MG CR tablet; Take 1 Tablet by mouth every morning for 30 days.  Dispense: 30 Tablet; Refill: 0  - methylphenidate (CONCERTA) 36 MG CR tablet; Take 1 Tablet by mouth every morning for 30 days.  Dispense: 30 Tablet; Refill: 0    2. Acute pain of left " shoulder  Will get xray and f/u with results  - DX-SHOULDER 2+ LEFT; Future

## 2024-03-25 ENCOUNTER — NON-PROVIDER VISIT (OUTPATIENT)
Dept: URGENT CARE | Facility: PHYSICIAN GROUP | Age: 64
End: 2024-03-25
Payer: COMMERCIAL

## 2024-03-25 ENCOUNTER — APPOINTMENT (OUTPATIENT)
Dept: RADIOLOGY | Facility: IMAGING CENTER | Age: 64
End: 2024-03-25
Attending: PHYSICIAN ASSISTANT
Payer: COMMERCIAL

## 2024-03-25 DIAGNOSIS — M25.512 ACUTE PAIN OF LEFT SHOULDER: ICD-10-CM

## 2024-03-25 PROCEDURE — 73030 X-RAY EXAM OF SHOULDER: CPT | Mod: TC,FY,LT | Performed by: RADIOLOGY

## 2024-03-27 DIAGNOSIS — M25.512 ACUTE PAIN OF LEFT SHOULDER: ICD-10-CM

## 2024-06-06 ENCOUNTER — HOSPITAL ENCOUNTER (OUTPATIENT)
Facility: MEDICAL CENTER | Age: 64
End: 2024-06-06
Attending: NURSE PRACTITIONER
Payer: COMMERCIAL

## 2024-06-06 ENCOUNTER — OFFICE VISIT (OUTPATIENT)
Dept: MEDICAL GROUP | Facility: PHYSICIAN GROUP | Age: 64
End: 2024-06-06
Payer: COMMERCIAL

## 2024-06-06 VITALS
HEIGHT: 70 IN | TEMPERATURE: 98.5 F | WEIGHT: 164.4 LBS | DIASTOLIC BLOOD PRESSURE: 68 MMHG | HEART RATE: 78 BPM | OXYGEN SATURATION: 98 % | RESPIRATION RATE: 18 BRPM | BODY MASS INDEX: 23.54 KG/M2 | SYSTOLIC BLOOD PRESSURE: 126 MMHG

## 2024-06-06 DIAGNOSIS — F90.0 ATTENTION DEFICIT HYPERACTIVITY DISORDER (ADHD), PREDOMINANTLY INATTENTIVE TYPE: ICD-10-CM

## 2024-06-06 DIAGNOSIS — E78.2 MIXED HYPERLIPIDEMIA: ICD-10-CM

## 2024-06-06 DIAGNOSIS — S76.312A STRAIN OF LEFT HAMSTRING, INITIAL ENCOUNTER: ICD-10-CM

## 2024-06-06 DIAGNOSIS — Z23 NEED FOR VACCINATION: ICD-10-CM

## 2024-06-06 DIAGNOSIS — M25.512 ACUTE PAIN OF LEFT SHOULDER: ICD-10-CM

## 2024-06-06 DIAGNOSIS — Z79.899 ENCOUNTER FOR LONG-TERM (CURRENT) USE OF HIGH-RISK MEDICATION: ICD-10-CM

## 2024-06-06 PROCEDURE — 90715 TDAP VACCINE 7 YRS/> IM: CPT | Performed by: NURSE PRACTITIONER

## 2024-06-06 PROCEDURE — 3074F SYST BP LT 130 MM HG: CPT | Performed by: NURSE PRACTITIONER

## 2024-06-06 PROCEDURE — 80307 DRUG TEST PRSMV CHEM ANLYZR: CPT

## 2024-06-06 PROCEDURE — 99214 OFFICE O/P EST MOD 30 MIN: CPT | Mod: 25 | Performed by: NURSE PRACTITIONER

## 2024-06-06 PROCEDURE — 3078F DIAST BP <80 MM HG: CPT | Performed by: NURSE PRACTITIONER

## 2024-06-06 PROCEDURE — 90471 IMMUNIZATION ADMIN: CPT | Performed by: NURSE PRACTITIONER

## 2024-06-06 RX ORDER — METHYLPHENIDATE HYDROCHLORIDE 36 MG/1
36 TABLET ORAL EVERY MORNING
Qty: 30 TABLET | Refills: 0 | Status: SHIPPED | OUTPATIENT
Start: 2024-08-07 | End: 2024-09-06

## 2024-06-06 RX ORDER — METHYLPHENIDATE HYDROCHLORIDE 36 MG/1
36 TABLET ORAL EVERY MORNING
Qty: 30 TABLET | Refills: 0 | Status: SHIPPED | OUTPATIENT
Start: 2024-06-06 | End: 2024-07-06

## 2024-06-06 RX ORDER — METHYLPHENIDATE HYDROCHLORIDE 36 MG/1
36 TABLET ORAL EVERY MORNING
Qty: 30 TABLET | Refills: 0 | Status: SHIPPED | OUTPATIENT
Start: 2024-07-07 | End: 2024-08-06

## 2024-06-06 ASSESSMENT — ENCOUNTER SYMPTOMS
WEIGHT LOSS: 0
CHILLS: 0
SHORTNESS OF BREATH: 0
FEVER: 0
PALPITATIONS: 0

## 2024-06-06 ASSESSMENT — PATIENT HEALTH QUESTIONNAIRE - PHQ9: CLINICAL INTERPRETATION OF PHQ2 SCORE: 0

## 2024-06-06 ASSESSMENT — FIBROSIS 4 INDEX: FIB4 SCORE: 1.8

## 2024-06-07 DIAGNOSIS — F90.0 ATTENTION DEFICIT HYPERACTIVITY DISORDER (ADHD), PREDOMINANTLY INATTENTIVE TYPE: ICD-10-CM

## 2024-06-07 DIAGNOSIS — Z79.899 ENCOUNTER FOR LONG-TERM (CURRENT) USE OF HIGH-RISK MEDICATION: ICD-10-CM

## 2024-06-07 NOTE — PROGRESS NOTES
"Verbal consent was acquired by the patient to use [employer] ambient listening note generation during this visit     CC:  Chief Complaint   Patient presents with    Annual Exam     Annual PE     Medication Refill     Concerta     Requesting Labs     Routine bloodwork no HIV/Hep C        Shashank Farmer 64 y.o. male  patient presenting for     History of Present Illness  The patient is a 64-year-old male presenting today for a follow-up consultation regarding his Concerta prescription, blood work, and a Tdap vaccination.    ADHD  The patient is currently on a regimen of Concerta 36 mg, administered as one tablet each morning.    Left shoulder pain   During his previous visit, the patient was referred to an orthopedic specialist for his left shoulder pain. Despite the recommendation for a six-week physical therapy regimen, the patient did not attend due to his wife's recent surgery. He reports an improvement in his shoulder condition, albeit with residual pain. He has been prescribed meloxicam, which he reports as effective. However, he expresses hesitancy in scheduling another appointment due to the persistent discomfort.    Left hamstring strain  The patient sustained a strain in his left hamstring during a soccer game. Although the condition has improved, it remains painful. He is not currently engaging in any stretching, ibuprofen, or massage as he has been advised against concurrent use of meloxicam.      Review of Systems   Constitutional:  Negative for chills, fever, malaise/fatigue and weight loss.   Respiratory:  Negative for shortness of breath.    Cardiovascular:  Negative for chest pain and palpitations.   Musculoskeletal:  Positive for joint pain.       /68   Pulse 78   Temp 36.9 °C (98.5 °F) (Temporal)   Resp 18   Ht 1.778 m (5' 10\")   Wt 74.6 kg (164 lb 6.4 oz)   SpO2 98% , Body mass index is 23.59 kg/m².    Physical Exam  Constitutional:       General: He is not in acute " distress.     Appearance: Normal appearance. He is normal weight. He is not ill-appearing.   HENT:      Head: Normocephalic and atraumatic.   Pulmonary:      Effort: Pulmonary effort is normal.   Skin:     General: Skin is dry.   Neurological:      Mental Status: He is alert and oriented to person, place, and time.   Psychiatric:         Mood and Affect: Mood normal.         Behavior: Behavior normal.         Thought Content: Thought content normal.         Judgment: Judgment normal.           Results      Assessment and Plan    Assessment & Plan          1. Attention deficit hyperactivity disorder (ADHD), predominantly inattentive type  Chronic and stable condition   PDMP reviewed  New CSC completed  New UDS completed  - PAIN MANAGEMENT SCRN, W/ RFLX TO QNT; Future  - Controlled Substance Treatment Agreement  - methylphenidate (CONCERTA) 36 MG CR tablet; Take 1 Tablet by mouth every morning for 30 days.  Dispense: 30 Tablet; Refill: 0  - methylphenidate (CONCERTA) 36 MG CR tablet; Take 1 Tablet by mouth every morning for 30 days.  Dispense: 30 Tablet; Refill: 0  - methylphenidate (CONCERTA) 36 MG CR tablet; Take 1 Tablet by mouth every morning for 30 days.  Dispense: 30 Tablet; Refill: 0    2. Need for vaccination  - Tdap Vaccine =>6YO IM    3. Encounter for long-term (current) use of high-risk medication  - PAIN MANAGEMENT SCRN, W/ RFLX TO QNT; Future  - Controlled Substance Treatment Agreement  - methylphenidate (CONCERTA) 36 MG CR tablet; Take 1 Tablet by mouth every morning for 30 days.  Dispense: 30 Tablet; Refill: 0  - methylphenidate (CONCERTA) 36 MG CR tablet; Take 1 Tablet by mouth every morning for 30 days.  Dispense: 30 Tablet; Refill: 0  - methylphenidate (CONCERTA) 36 MG CR tablet; Take 1 Tablet by mouth every morning for 30 days.  Dispense: 30 Tablet; Refill: 0  - CBC WITHOUT DIFFERENTIAL; Future    4. Acute pain of left shoulder  Acute and uncomplicated condition   - Referral to Physical  Therapy    5. Strain of left hamstring, initial encounter  Acute and uncomplicated condition   - Referral to Physical Therapy    6. Mixed hyperlipidemia  Chronic and stable condition   - CBC WITHOUT DIFFERENTIAL; Future  - Lipid Profile; Future  - Comp Metabolic Panel; Future          Discussed with patient possible alternative diagnoses, patient is to take all medications as prescribed.     If symptoms persist FU w/PCP, if symptoms worsen go to emergency room.     If experiencing any side effects from prescribed medications reports to the office immediately or go to emergency room.    Reviewed indication, dosage, usage and potential adverse effects of prescribed medications.     Reviewed risks and benefits of treatment plan. Patient verbalizes understanding of all instruction and verbally agrees to plan.    Return for pending labs- annual physical exam.    This note was created using voice recognition software ([employer]). The accuracy of the dictation is limited by the abilities of the software. I have reviewed the note prior to signing, however some errors in grammar and context are still possible. If you have any questions related to this note please do not hesitate to contact our office.

## 2024-06-09 LAB
AMPHET CTO UR CFM-MCNC: NEGATIVE NG/ML
BARBITURATES CTO UR CFM-MCNC: NEGATIVE NG/ML
BENZODIAZ CTO UR CFM-MCNC: NEGATIVE NG/ML
BUPRENORPHINE UR-MCNC: NEGATIVE NG/ML
CANNABINOIDS CTO UR CFM-MCNC: NEGATIVE NG/ML
CARISOPRODOL UR-MCNC: NEGATIVE NG/ML
COCAINE CTO UR CFM-MCNC: NEGATIVE NG/ML
CREAT UR-MCNC: 62.2 MG/DL (ref 20–400)
DRUG SCREEN COMMENT UR-IMP: NORMAL
ETHYL GLUCURONIDE UR QL SCN: NEGATIVE NG/ML
FENTANYL UR-MCNC: NEGATIVE NG/ML
MEPERIDINE CTO UR SCN-MCNC: NEGATIVE NG/ML
METHADONE CTO UR CFM-MCNC: NEGATIVE NG/ML
OPIATES UR QL SCN: NEGATIVE NG/ML
OXYCDOXYM URSCRN 2005102: NEGATIVE NG/ML
PCP CTO UR CFM-MCNC: NEGATIVE NG/ML
PROPOXYPH CTO UR CFM-MCNC: NEGATIVE NG/ML
TAPENTADOL UR-MCNC: NEGATIVE NG/ML
TRAMADOL CTO UR SCN-MCNC: NEGATIVE NG/ML
ZOLPIDEM UR-MCNC: NEGATIVE NG/ML

## 2024-06-12 ENCOUNTER — HOSPITAL ENCOUNTER (OUTPATIENT)
Dept: LAB | Facility: MEDICAL CENTER | Age: 64
End: 2024-06-12
Attending: NURSE PRACTITIONER
Payer: COMMERCIAL

## 2024-06-12 DIAGNOSIS — E78.2 MIXED HYPERLIPIDEMIA: ICD-10-CM

## 2024-06-12 DIAGNOSIS — Z79.899 ENCOUNTER FOR LONG-TERM (CURRENT) USE OF HIGH-RISK MEDICATION: ICD-10-CM

## 2024-06-12 LAB
ERYTHROCYTE [DISTWIDTH] IN BLOOD BY AUTOMATED COUNT: 43.5 FL (ref 35.9–50)
HCT VFR BLD AUTO: 40.7 % (ref 42–52)
HGB BLD-MCNC: 13.8 G/DL (ref 14–18)
MCH RBC QN AUTO: 30.1 PG (ref 27–33)
MCHC RBC AUTO-ENTMCNC: 33.9 G/DL (ref 32.3–36.5)
MCV RBC AUTO: 88.9 FL (ref 81.4–97.8)
PLATELET # BLD AUTO: 227 K/UL (ref 164–446)
PMV BLD AUTO: 10.1 FL (ref 9–12.9)
RBC # BLD AUTO: 4.58 M/UL (ref 4.7–6.1)
WBC # BLD AUTO: 4.7 K/UL (ref 4.8–10.8)

## 2024-06-12 PROCEDURE — 80061 LIPID PANEL: CPT

## 2024-06-12 PROCEDURE — 36415 COLL VENOUS BLD VENIPUNCTURE: CPT

## 2024-06-12 PROCEDURE — 85027 COMPLETE CBC AUTOMATED: CPT

## 2024-06-12 PROCEDURE — 80053 COMPREHEN METABOLIC PANEL: CPT

## 2024-06-13 LAB
ALBUMIN SERPL BCP-MCNC: 4.1 G/DL (ref 3.2–4.9)
ALBUMIN/GLOB SERPL: 1.5 G/DL
ALP SERPL-CCNC: 70 U/L (ref 30–99)
ALT SERPL-CCNC: 24 U/L (ref 2–50)
ANION GAP SERPL CALC-SCNC: 12 MMOL/L (ref 7–16)
AST SERPL-CCNC: 28 U/L (ref 12–45)
BILIRUB SERPL-MCNC: 0.7 MG/DL (ref 0.1–1.5)
BUN SERPL-MCNC: 22 MG/DL (ref 8–22)
CALCIUM ALBUM COR SERPL-MCNC: 8.8 MG/DL (ref 8.5–10.5)
CALCIUM SERPL-MCNC: 8.9 MG/DL (ref 8.5–10.5)
CHLORIDE SERPL-SCNC: 107 MMOL/L (ref 96–112)
CHOLEST SERPL-MCNC: 200 MG/DL (ref 100–199)
CO2 SERPL-SCNC: 21 MMOL/L (ref 20–33)
CREAT SERPL-MCNC: 1.14 MG/DL (ref 0.5–1.4)
GFR SERPLBLD CREATININE-BSD FMLA CKD-EPI: 71 ML/MIN/1.73 M 2
GLOBULIN SER CALC-MCNC: 2.8 G/DL (ref 1.9–3.5)
GLUCOSE SERPL-MCNC: 87 MG/DL (ref 65–99)
HDLC SERPL-MCNC: 35 MG/DL
LDLC SERPL CALC-MCNC: 131 MG/DL
POTASSIUM SERPL-SCNC: 4.4 MMOL/L (ref 3.6–5.5)
PROT SERPL-MCNC: 6.9 G/DL (ref 6–8.2)
SODIUM SERPL-SCNC: 140 MMOL/L (ref 135–145)
TRIGL SERPL-MCNC: 168 MG/DL (ref 0–149)

## 2024-07-31 ENCOUNTER — OFFICE VISIT (OUTPATIENT)
Dept: MEDICAL GROUP | Facility: PHYSICIAN GROUP | Age: 64
End: 2024-07-31
Payer: COMMERCIAL

## 2024-07-31 VITALS
TEMPERATURE: 98 F | OXYGEN SATURATION: 96 % | HEART RATE: 71 BPM | RESPIRATION RATE: 18 BRPM | WEIGHT: 158 LBS | SYSTOLIC BLOOD PRESSURE: 122 MMHG | HEIGHT: 70 IN | BODY MASS INDEX: 22.62 KG/M2 | DIASTOLIC BLOOD PRESSURE: 72 MMHG

## 2024-07-31 DIAGNOSIS — G25.81 RESTLESS LEG SYNDROME: ICD-10-CM

## 2024-07-31 DIAGNOSIS — D50.9 IRON DEFICIENCY ANEMIA, UNSPECIFIED IRON DEFICIENCY ANEMIA TYPE: ICD-10-CM

## 2024-07-31 ASSESSMENT — ENCOUNTER SYMPTOMS
PALPITATIONS: 0
CHILLS: 0
DIZZINESS: 0
SHORTNESS OF BREATH: 0
HEADACHES: 0
WEIGHT LOSS: 0
FEVER: 0

## 2024-07-31 ASSESSMENT — FIBROSIS 4 INDEX: FIB4 SCORE: 1.66

## 2024-08-01 ENCOUNTER — HOSPITAL ENCOUNTER (OUTPATIENT)
Dept: LAB | Facility: MEDICAL CENTER | Age: 64
End: 2024-08-01
Attending: NURSE PRACTITIONER
Payer: COMMERCIAL

## 2024-08-01 DIAGNOSIS — G25.81 RESTLESS LEG SYNDROME: ICD-10-CM

## 2024-08-01 DIAGNOSIS — D50.9 IRON DEFICIENCY ANEMIA, UNSPECIFIED IRON DEFICIENCY ANEMIA TYPE: ICD-10-CM

## 2024-08-01 LAB
BASOPHILS # BLD AUTO: 0.9 % (ref 0–1.8)
BASOPHILS # BLD: 0.06 K/UL (ref 0–0.12)
EOSINOPHIL # BLD AUTO: 0.1 K/UL (ref 0–0.51)
EOSINOPHIL NFR BLD: 1.6 % (ref 0–6.9)
ERYTHROCYTE [DISTWIDTH] IN BLOOD BY AUTOMATED COUNT: 44.4 FL (ref 35.9–50)
FERRITIN SERPL-MCNC: 178 NG/ML (ref 22–322)
FOLATE SERPL-MCNC: 23.3 NG/ML
HCT VFR BLD AUTO: 41 % (ref 42–52)
HGB BLD-MCNC: 13.9 G/DL (ref 14–18)
IMM GRANULOCYTES # BLD AUTO: 0.02 K/UL (ref 0–0.11)
IMM GRANULOCYTES NFR BLD AUTO: 0.3 % (ref 0–0.9)
IRON SATN MFR SERPL: 23 % (ref 15–55)
IRON SERPL-MCNC: 62 UG/DL (ref 50–180)
LYMPHOCYTES # BLD AUTO: 1.68 K/UL (ref 1–4.8)
LYMPHOCYTES NFR BLD: 26.5 % (ref 22–41)
MCH RBC QN AUTO: 30.3 PG (ref 27–33)
MCHC RBC AUTO-ENTMCNC: 33.9 G/DL (ref 32.3–36.5)
MCV RBC AUTO: 89.3 FL (ref 81.4–97.8)
MONOCYTES # BLD AUTO: 0.46 K/UL (ref 0–0.85)
MONOCYTES NFR BLD AUTO: 7.2 % (ref 0–13.4)
NEUTROPHILS # BLD AUTO: 4.03 K/UL (ref 1.82–7.42)
NEUTROPHILS NFR BLD: 63.5 % (ref 44–72)
NRBC # BLD AUTO: 0 K/UL
NRBC BLD-RTO: 0 /100 WBC (ref 0–0.2)
PLATELET # BLD AUTO: 220 K/UL (ref 164–446)
PMV BLD AUTO: 10.7 FL (ref 9–12.9)
RBC # BLD AUTO: 4.59 M/UL (ref 4.7–6.1)
TIBC SERPL-MCNC: 270 UG/DL (ref 250–450)
UIBC SERPL-MCNC: 208 UG/DL (ref 110–370)
VIT B12 SERPL-MCNC: 844 PG/ML (ref 211–911)
WBC # BLD AUTO: 6.4 K/UL (ref 4.8–10.8)

## 2024-08-01 PROCEDURE — 82607 VITAMIN B-12: CPT

## 2024-08-01 PROCEDURE — 36415 COLL VENOUS BLD VENIPUNCTURE: CPT

## 2024-08-01 PROCEDURE — 83540 ASSAY OF IRON: CPT

## 2024-08-01 PROCEDURE — 82728 ASSAY OF FERRITIN: CPT

## 2024-08-01 PROCEDURE — 82746 ASSAY OF FOLIC ACID SERUM: CPT

## 2024-08-01 PROCEDURE — 83550 IRON BINDING TEST: CPT

## 2024-08-01 PROCEDURE — 85025 COMPLETE CBC W/AUTO DIFF WBC: CPT

## 2024-12-10 ENCOUNTER — OFFICE VISIT (OUTPATIENT)
Dept: MEDICAL GROUP | Facility: PHYSICIAN GROUP | Age: 64
End: 2024-12-10
Payer: COMMERCIAL

## 2024-12-10 VITALS
SYSTOLIC BLOOD PRESSURE: 120 MMHG | BODY MASS INDEX: 22.76 KG/M2 | HEART RATE: 78 BPM | WEIGHT: 159 LBS | TEMPERATURE: 97.4 F | DIASTOLIC BLOOD PRESSURE: 80 MMHG | RESPIRATION RATE: 14 BRPM | HEIGHT: 70 IN | OXYGEN SATURATION: 98 %

## 2024-12-10 DIAGNOSIS — M25.512 CHRONIC LEFT SHOULDER PAIN: ICD-10-CM

## 2024-12-10 DIAGNOSIS — G89.29 CHRONIC LEFT SHOULDER PAIN: ICD-10-CM

## 2024-12-10 DIAGNOSIS — Z12.12 SCREENING FOR COLORECTAL CANCER: ICD-10-CM

## 2024-12-10 DIAGNOSIS — F90.0 ATTENTION DEFICIT HYPERACTIVITY DISORDER (ADHD), PREDOMINANTLY INATTENTIVE TYPE: ICD-10-CM

## 2024-12-10 DIAGNOSIS — R53.83 OTHER FATIGUE: ICD-10-CM

## 2024-12-10 DIAGNOSIS — Z12.11 SCREENING FOR COLORECTAL CANCER: ICD-10-CM

## 2024-12-10 PROCEDURE — 99214 OFFICE O/P EST MOD 30 MIN: CPT | Performed by: NURSE PRACTITIONER

## 2024-12-10 PROCEDURE — 3074F SYST BP LT 130 MM HG: CPT | Performed by: NURSE PRACTITIONER

## 2024-12-10 PROCEDURE — 3079F DIAST BP 80-89 MM HG: CPT | Performed by: NURSE PRACTITIONER

## 2024-12-10 RX ORDER — METHYLPHENIDATE HYDROCHLORIDE 36 MG/1
TABLET ORAL
COMMUNITY
Start: 2024-09-27 | End: 2024-12-10 | Stop reason: SDUPTHER

## 2024-12-10 RX ORDER — MELOXICAM 15 MG/1
15 TABLET ORAL DAILY
Qty: 30 TABLET | Refills: 0 | Status: CANCELLED | OUTPATIENT
Start: 2024-12-10

## 2024-12-10 RX ORDER — METHYLPHENIDATE HYDROCHLORIDE 36 MG/1
36 TABLET ORAL EVERY MORNING
Qty: 90 TABLET | Refills: 0 | Status: SHIPPED | OUTPATIENT
Start: 2024-12-10 | End: 2025-03-10

## 2024-12-10 ASSESSMENT — ENCOUNTER SYMPTOMS
WEIGHT LOSS: 0
HEADACHES: 0
SHORTNESS OF BREATH: 0
PALPITATIONS: 0
EYES NEGATIVE: 1
DIZZINESS: 1
FEVER: 0
CHILLS: 0

## 2024-12-10 ASSESSMENT — FIBROSIS 4 INDEX: FIB4 SCORE: 1.714642819948224669

## 2024-12-10 NOTE — PROGRESS NOTES
Verbal consent was acquired by the patient to use [employer] ambient listening note generation during this visit     CC:  Chief Complaint   Patient presents with    Medication Refill    Anemia       Shashank Farmer 64 y.o. male  patient presenting for     History of Present Illness  The patient is a 64-year-old male who presents for evaluation of dizziness, cat bite injury, and discuss overall health.    He experienced an episode of dizziness approximately 3 weeks ago while working in his yard. He reports adequate hydration during outdoor activities but does not monitor his blood pressure during these episodes. He describes his blood pressure as typically low and does not regularly check it at home. The dizziness is primarily experienced during physical activity, particularly when bending or moving. He reports no associated symptoms such as palpitations, chest pain, nausea, vomiting, or visual changes. The dizziness is described as a sensation of wooziness rather than vertigo and typically resolves within a few hours. He has not attempted to alleviate the dizziness with food or drink.    He sustained a cat bite injury approximately 1.5 weeks ago, which has since become infected. He reports no erythema or ascending lymphangitis. The pain is localized to the surface of the skin and is exacerbated by finger flexion. He drained the wound a few days ago due to increasing pain and discoloration. The wound has since improved, but he notes that the skin has begun to crack, causing additional discomfort.    He is seeking a refill of his Concerta prescription, which he takes daily in the morning without any adverse effects.        Review of Systems   Constitutional:  Negative for chills, fever, malaise/fatigue and weight loss.   HENT: Negative.     Eyes: Negative.    Respiratory:  Negative for shortness of breath.    Cardiovascular:  Negative for chest pain and palpitations.   Skin: Negative.    Neurological:   "Positive for dizziness. Negative for headaches.       /80   Pulse 78   Temp 36.3 °C (97.4 °F) (Temporal)   Resp 14   Ht 1.778 m (5' 10\")   Wt 72.1 kg (159 lb)   SpO2 98% , Body mass index is 22.81 kg/m².    Physical Exam  Constitutional:       Appearance: Normal appearance. He is normal weight.   HENT:      Head: Normocephalic and atraumatic.   Pulmonary:      Effort: Pulmonary effort is normal.   Neurological:      Mental Status: He is alert and oriented to person, place, and time.   Psychiatric:         Mood and Affect: Mood normal.         Behavior: Behavior normal.         Thought Content: Thought content normal.         Judgment: Judgment normal.           Results  Laboratory Studies 8/1/2024  Iron level was 23. Ferritin was 176, 178.    Assessment and Plan    Assessment & Plan  1. Dizziness.  His blood pressure readings are within normal limits today. The potential causes of his dizziness could be multifactorial, including hypotension, iron deficiency, hemoglobin or hematocrit abnormalities, carotid artery disease, cardiac issues, or hypoglycemia. He is advised to monitor his blood pressure during episodes of dizziness. He should also ensure adequate hydration and nutrition, particularly during outdoor activities. If symptoms persist, he should rest and monitor for any associated symptoms such as headache, visual changes, or chest pain. A complete blood count (CBC) and iron panel will be ordered to further investigate the cause of his dizziness.    2. Cat bite injury.  There is no evidence of infection at the site of the cat bite. He is advised to continue monitoring the wound for any signs of infection such as redness, swelling, or streaking up the hand. He may apply a bandage and topical cream once daily to aid in healing.    3. Medication management.  A prescription for Concerta has been refilled and sent to his pharmacy at [facility].    4. Health maintenance.  A referral for a colonoscopy has " been provided. A CBC and iron panel will be ordered to assess for any underlying anemia or iron deficiency. Thyroid function tests will also be conducted to rule out any thyroid-related issues.        1. Chronic left shoulder pain  Chronic and stable condition    2. Attention deficit hyperactivity disorder (ADHD), predominantly inattentive type  Chronic and stable condition  PDMP reviewed  UDS and CSA  up-to-date  - methylphenidate (CONCERTA) 36 MG CR tablet; Take 1 Tablet by mouth every morning for 90 days.  Dispense: 90 Tablet; Refill: 0    3. Screening for colorectal cancer  - Referral to GI for Colonoscopy    4. Other fatigue  Chronic and stable condition  - CBC WITH DIFFERENTIAL; Future  - IRON/TOTAL IRON BIND; Future  - TSH WITH REFLEX TO FT4; Future  - FERRITIN; Future           Discussed with patient possible alternative diagnoses, patient is to take all medications as prescribed.     If symptoms persist FU w/PCP, if symptoms worsen go to emergency room.     If experiencing any side effects from prescribed medications reports to the office immediately or go to emergency room.    Reviewed indication, dosage, usage and potential adverse effects of prescribed medications.     Reviewed risks and benefits of treatment plan. Patient verbalizes understanding of all instruction and verbally agrees to plan.    Return for Pending labs.    This note was created using voice recognition software ([facility]). The accuracy of the dictation is limited by the abilities of the software. I have reviewed the note prior to signing, however some errors in grammar and context are still possible. If you have any questions related to this note please do not hesitate to contact our office.

## 2024-12-12 ENCOUNTER — HOSPITAL ENCOUNTER (OUTPATIENT)
Dept: LAB | Facility: MEDICAL CENTER | Age: 64
End: 2024-12-12
Attending: NURSE PRACTITIONER
Payer: COMMERCIAL

## 2024-12-12 DIAGNOSIS — R53.83 OTHER FATIGUE: ICD-10-CM

## 2024-12-12 LAB
BASOPHILS # BLD AUTO: 1.2 % (ref 0–1.8)
BASOPHILS # BLD: 0.08 K/UL (ref 0–0.12)
EOSINOPHIL # BLD AUTO: 0.11 K/UL (ref 0–0.51)
EOSINOPHIL NFR BLD: 1.6 % (ref 0–6.9)
ERYTHROCYTE [DISTWIDTH] IN BLOOD BY AUTOMATED COUNT: 44.7 FL (ref 35.9–50)
FERRITIN SERPL-MCNC: 326 NG/ML (ref 22–322)
HCT VFR BLD AUTO: 44.7 % (ref 42–52)
HGB BLD-MCNC: 15 G/DL (ref 14–18)
IMM GRANULOCYTES # BLD AUTO: 0.03 K/UL (ref 0–0.11)
IMM GRANULOCYTES NFR BLD AUTO: 0.4 % (ref 0–0.9)
IRON SATN MFR SERPL: 25 % (ref 15–55)
IRON SERPL-MCNC: 75 UG/DL (ref 50–180)
LYMPHOCYTES # BLD AUTO: 1.68 K/UL (ref 1–4.8)
LYMPHOCYTES NFR BLD: 25.1 % (ref 22–41)
MCH RBC QN AUTO: 30.6 PG (ref 27–33)
MCHC RBC AUTO-ENTMCNC: 33.6 G/DL (ref 32.3–36.5)
MCV RBC AUTO: 91.2 FL (ref 81.4–97.8)
MONOCYTES # BLD AUTO: 0.56 K/UL (ref 0–0.85)
MONOCYTES NFR BLD AUTO: 8.4 % (ref 0–13.4)
NEUTROPHILS # BLD AUTO: 4.24 K/UL (ref 1.82–7.42)
NEUTROPHILS NFR BLD: 63.3 % (ref 44–72)
NRBC # BLD AUTO: 0 K/UL
NRBC BLD-RTO: 0 /100 WBC (ref 0–0.2)
PLATELET # BLD AUTO: 246 K/UL (ref 164–446)
PMV BLD AUTO: 9.8 FL (ref 9–12.9)
RBC # BLD AUTO: 4.9 M/UL (ref 4.7–6.1)
T4 FREE SERPL-MCNC: 1.12 NG/DL (ref 0.93–1.7)
TIBC SERPL-MCNC: 300 UG/DL (ref 250–450)
TSH SERPL DL<=0.005 MIU/L-ACNC: 5.81 UIU/ML (ref 0.38–5.33)
UIBC SERPL-MCNC: 225 UG/DL (ref 110–370)
WBC # BLD AUTO: 6.7 K/UL (ref 4.8–10.8)

## 2024-12-12 PROCEDURE — 83540 ASSAY OF IRON: CPT

## 2024-12-12 PROCEDURE — 84443 ASSAY THYROID STIM HORMONE: CPT

## 2024-12-12 PROCEDURE — 84439 ASSAY OF FREE THYROXINE: CPT

## 2024-12-12 PROCEDURE — 83550 IRON BINDING TEST: CPT

## 2024-12-12 PROCEDURE — 82728 ASSAY OF FERRITIN: CPT

## 2024-12-12 PROCEDURE — 36415 COLL VENOUS BLD VENIPUNCTURE: CPT

## 2024-12-12 PROCEDURE — 85025 COMPLETE CBC W/AUTO DIFF WBC: CPT

## 2025-06-06 ENCOUNTER — APPOINTMENT (OUTPATIENT)
Dept: MEDICAL GROUP | Facility: PHYSICIAN GROUP | Age: 65
End: 2025-06-06

## 2025-06-06 ENCOUNTER — HOSPITAL ENCOUNTER (OUTPATIENT)
Facility: MEDICAL CENTER | Age: 65
End: 2025-06-06
Attending: NURSE PRACTITIONER
Payer: COMMERCIAL

## 2025-06-06 VITALS
WEIGHT: 165 LBS | TEMPERATURE: 97.7 F | BODY MASS INDEX: 23.62 KG/M2 | OXYGEN SATURATION: 97 % | HEART RATE: 67 BPM | DIASTOLIC BLOOD PRESSURE: 64 MMHG | HEIGHT: 70 IN | SYSTOLIC BLOOD PRESSURE: 124 MMHG

## 2025-06-06 DIAGNOSIS — E78.2 MIXED HYPERLIPIDEMIA: ICD-10-CM

## 2025-06-06 DIAGNOSIS — Z79.899 HISTORY OF LONG-TERM TREATMENT WITH HIGH-RISK MEDICATION: ICD-10-CM

## 2025-06-06 DIAGNOSIS — G25.81 RESTLESS LEG SYNDROME: ICD-10-CM

## 2025-06-06 DIAGNOSIS — F90.0 ATTENTION DEFICIT HYPERACTIVITY DISORDER (ADHD), PREDOMINANTLY INATTENTIVE TYPE: ICD-10-CM

## 2025-06-06 DIAGNOSIS — E03.8 SUBCLINICAL HYPOTHYROIDISM: Primary | ICD-10-CM

## 2025-06-06 DIAGNOSIS — Z00.00 BLOOD TESTS FOR ROUTINE GENERAL PHYSICAL EXAMINATION: ICD-10-CM

## 2025-06-06 PROBLEM — R14.0 ABDOMINAL BLOATING: Status: RESOLVED | Noted: 2020-06-09 | Resolved: 2025-06-06

## 2025-06-06 PROBLEM — M76.61 ACHILLES TENDINITIS OF RIGHT LOWER EXTREMITY: Status: RESOLVED | Noted: 2021-08-31 | Resolved: 2025-06-06

## 2025-06-06 PROCEDURE — 3074F SYST BP LT 130 MM HG: CPT | Performed by: NURSE PRACTITIONER

## 2025-06-06 PROCEDURE — 80307 DRUG TEST PRSMV CHEM ANLYZR: CPT

## 2025-06-06 PROCEDURE — 99214 OFFICE O/P EST MOD 30 MIN: CPT | Performed by: NURSE PRACTITIONER

## 2025-06-06 PROCEDURE — 3078F DIAST BP <80 MM HG: CPT | Performed by: NURSE PRACTITIONER

## 2025-06-06 RX ORDER — METHYLPHENIDATE HYDROCHLORIDE 36 MG/1
36 TABLET ORAL EVERY MORNING
Qty: 90 TABLET | Refills: 0 | Status: SHIPPED | OUTPATIENT
Start: 2025-06-06 | End: 2025-09-04

## 2025-06-06 ASSESSMENT — ENCOUNTER SYMPTOMS
DIZZINESS: 0
WEIGHT LOSS: 0
HEADACHES: 0
VOMITING: 0
NAUSEA: 0
NERVOUS/ANXIOUS: 0
FEVER: 0
SHORTNESS OF BREATH: 1
CHILLS: 0
INSOMNIA: 0
BLOOD IN STOOL: 0
DIARRHEA: 0
DEPRESSION: 0
ABDOMINAL PAIN: 0
CONSTIPATION: 0
PALPITATIONS: 0

## 2025-06-06 ASSESSMENT — FIBROSIS 4 INDEX: FIB4 SCORE: 1.56

## 2025-06-06 ASSESSMENT — PATIENT HEALTH QUESTIONNAIRE - PHQ9: CLINICAL INTERPRETATION OF PHQ2 SCORE: 0

## 2025-06-06 NOTE — PROGRESS NOTES
"Verbal consent was acquired by the patient to use [employer] ambient listening note generation during this visit     CC:  Chief Complaint   Patient presents with    Medication Refill     Methylphenidate        Shashank Farmer 65 y.o. male  patient presenting for     History of Present Illness  The patient is a 65-year-old male who presents for follow-up and medication refills.    Breathing Issues  - Intermittent shallow breathing attributed to allergies  - Symptom was present the previous year  - Shortness of breath while singing but not during physical activities such as running    Gastrointestinal Issues  - Due for a colonoscopy but has not yet scheduled the procedure  - Bowel movements have shifted from occurring both day and night to predominantly in the morning  - No presence of blood in stool, diarrhea, or constipation  - No abdominal pain or bloating    Medication  - Seeking a refill of Concerta prescription  - Typically takes medication during the week, with occasional lapses over the weekend  - Recently resumed taking it after a brief period of discontinuation          Review of Systems   Constitutional:  Negative for chills, fever, malaise/fatigue and weight loss.   Respiratory:  Positive for shortness of breath.         With singing but not exercising   Cardiovascular:  Negative for chest pain and palpitations.   Gastrointestinal:  Negative for abdominal pain, blood in stool, constipation, diarrhea, nausea and vomiting.   Neurological:  Negative for dizziness and headaches.   Psychiatric/Behavioral:  Negative for depression. The patient is not nervous/anxious and does not have insomnia.        /64 (BP Location: Left arm, Patient Position: Sitting, BP Cuff Size: Adult)   Pulse 67   Temp 36.5 °C (97.7 °F)   Ht 1.778 m (5' 10\")   Wt 74.8 kg (165 lb)   SpO2 97% , Body mass index is 23.68 kg/m².    Physical Exam  Constitutional:       Appearance: Normal appearance. He is normal weight. "   HENT:      Head: Normocephalic and atraumatic.   Pulmonary:      Effort: Pulmonary effort is normal.   Neurological:      Mental Status: He is alert and oriented to person, place, and time.   Psychiatric:         Mood and Affect: Mood normal.         Behavior: Behavior normal.         Thought Content: Thought content normal.         Judgment: Judgment normal.           Results      Assessment and Plan    Assessment & Plan  1. Shortness of breath.  - Reports experiencing shortness of breath, particularly when singing, but not during physical activities like running.  - Examination of lung capacity will be conducted today.  - No swelling in legs or hands noted upon physical examination.  - Ears appear normal upon examination.    2. Bloating.  - Reports waking up at night feeling bloated and having changes in bowel habits, including needing to go to the bathroom in the middle of the night.  - No blood in stool, diarrhea, constipation, abdominal pain, or abdominal bloating reported.  - Advised to schedule a colonoscopy as soon as possible to investigate these symptoms further.  - Continues to be physically active without abdominal pain or bloating.    3. Medication management.  - Due for new blood work and a urine test.  - Prescription for Concerta (methylphenidate) 90-day supply will be sent to the pharmacy.  - Will sign the necessary paperwork for Concerta today.  - Fasting blood work will be ordered, and can be completed at his convenience.    4. Follow-up.  - Follow-up appointment scheduled in 3 months.  - Will return for refill of phenylephrine if needed.        1. Subclinical hypothyroidism (Primary)  Chronic and stable condition  - FREE THYROXINE; Future  - TSH; Future    2. Mixed hyperlipidemia  Chronic and stable condition  - Lipid Profile; Future  - Comp Metabolic Panel; Future    3. Restless leg syndrome  Chronic and stable condition  - CBC WITHOUT DIFFERENTIAL; Future  - Comp Metabolic Panel; Future    4.  Attention deficit hyperactivity disorder (ADHD), predominantly inattentive type  Chronic and stable condition  PDMP reviewed  New CSC and UDS completed  - methylphenidate (CONCERTA) 36 MG CR tablet; Take 1 Tablet by mouth every morning for 90 days.  Dispense: 90 Tablet; Refill: 0  - PAIN MANAGEMENT SCRN, W/ RFLX TO QNT; Future  - Controlled Substance Treatment Agreement    5. Blood tests for routine general physical examination  - CBC WITHOUT DIFFERENTIAL; Future  - FREE THYROXINE; Future  - HEMOGLOBIN A1C; Future  - Lipid Profile; Future  - TSH; Future  - Comp Metabolic Panel; Future    6. History of long-term treatment with high-risk medication  Chronic and stable condition  PDMP reviewed  New CSC and UDS completed  - PAIN MANAGEMENT SCRN, W/ RFLX TO QNT; Future  - Controlled Substance Treatment Agreement       Discussed with patient possible alternative diagnoses, patient is to take all medications as prescribed.     If symptoms persist FU w/PCP, if symptoms worsen go to emergency room.     If experiencing any side effects from prescribed medications reports to the office immediately or go to emergency room.    Reviewed indication, dosage, usage and potential adverse effects of prescribed medications.     Reviewed risks and benefits of treatment plan. Patient verbalizes understanding of all instruction and verbally agrees to plan.    Return in about 3 months (around 9/6/2025) for follow up refills and labs.    This note was created using voice recognition software ([employer]). The accuracy of the dictation is limited by the abilities of the software. I have reviewed the note prior to signing, however some errors in grammar and context are still possible. If you have any questions related to this note please do not hesitate to contact our office.

## 2025-06-08 LAB
AMPHET CTO UR CFM-MCNC: NEGATIVE NG/ML
BARBITURATES CTO UR CFM-MCNC: NEGATIVE NG/ML
BENZODIAZ CTO UR CFM-MCNC: NEGATIVE NG/ML
BUPRENORPHINE UR-MCNC: NEGATIVE NG/ML
CANNABINOIDS CTO UR CFM-MCNC: NEGATIVE NG/ML
CARISOPRODOL UR-MCNC: NEGATIVE NG/ML
COCAINE CTO UR CFM-MCNC: NEGATIVE NG/ML
CREAT UR-MCNC: 23.8 MG/DL (ref 20–400)
DRUG SCREEN COMMENT UR-IMP: NORMAL
ETHYL GLUCURONIDE UR QL SCN: NEGATIVE NG/ML
FENTANYL UR-MCNC: NEGATIVE NG/ML
MEPERIDINE CTO UR SCN-MCNC: NEGATIVE NG/ML
METHADONE CTO UR CFM-MCNC: NEGATIVE NG/ML
OPIATES UR QL SCN: NEGATIVE NG/ML
OXYCDOXYM URSCRN 2005102: NEGATIVE NG/ML
PCP CTO UR CFM-MCNC: NEGATIVE NG/ML
PROPOXYPH CTO UR CFM-MCNC: NEGATIVE NG/ML
TAPENTADOL UR-MCNC: NEGATIVE NG/ML
TRAMADOL CTO UR SCN-MCNC: NEGATIVE NG/ML
ZOLPIDEM UR-MCNC: NEGATIVE NG/ML

## 2025-06-13 ENCOUNTER — HOSPITAL ENCOUNTER (OUTPATIENT)
Dept: LAB | Facility: MEDICAL CENTER | Age: 65
End: 2025-06-13
Attending: NURSE PRACTITIONER
Payer: COMMERCIAL

## 2025-06-13 DIAGNOSIS — G25.81 RESTLESS LEG SYNDROME: ICD-10-CM

## 2025-06-13 DIAGNOSIS — E78.2 MIXED HYPERLIPIDEMIA: ICD-10-CM

## 2025-06-13 DIAGNOSIS — Z00.00 BLOOD TESTS FOR ROUTINE GENERAL PHYSICAL EXAMINATION: ICD-10-CM

## 2025-06-13 DIAGNOSIS — E03.8 SUBCLINICAL HYPOTHYROIDISM: ICD-10-CM

## 2025-06-13 LAB
ERYTHROCYTE [DISTWIDTH] IN BLOOD BY AUTOMATED COUNT: 42.3 FL (ref 35.9–50)
EST. AVERAGE GLUCOSE BLD GHB EST-MCNC: 111 MG/DL
HBA1C MFR BLD: 5.5 % (ref 4–5.6)
HCT VFR BLD AUTO: 42 % (ref 42–52)
HGB BLD-MCNC: 14.1 G/DL (ref 14–18)
MCH RBC QN AUTO: 29.5 PG (ref 27–33)
MCHC RBC AUTO-ENTMCNC: 33.6 G/DL (ref 32.3–36.5)
MCV RBC AUTO: 87.9 FL (ref 81.4–97.8)
PLATELET # BLD AUTO: 194 K/UL (ref 164–446)
PMV BLD AUTO: 9.8 FL (ref 9–12.9)
RBC # BLD AUTO: 4.78 M/UL (ref 4.7–6.1)
WBC # BLD AUTO: 4.5 K/UL (ref 4.8–10.8)

## 2025-06-13 PROCEDURE — 36415 COLL VENOUS BLD VENIPUNCTURE: CPT

## 2025-06-13 PROCEDURE — 83036 HEMOGLOBIN GLYCOSYLATED A1C: CPT

## 2025-06-13 PROCEDURE — 85027 COMPLETE CBC AUTOMATED: CPT

## 2025-06-13 PROCEDURE — 84443 ASSAY THYROID STIM HORMONE: CPT

## 2025-06-13 PROCEDURE — 80053 COMPREHEN METABOLIC PANEL: CPT

## 2025-06-13 PROCEDURE — 84439 ASSAY OF FREE THYROXINE: CPT

## 2025-06-13 PROCEDURE — 80061 LIPID PANEL: CPT

## 2025-06-14 LAB
ALBUMIN SERPL BCP-MCNC: 4.1 G/DL (ref 3.2–4.9)
ALBUMIN/GLOB SERPL: 1.5 G/DL
ALP SERPL-CCNC: 85 U/L (ref 30–99)
ALT SERPL-CCNC: 26 U/L (ref 2–50)
ANION GAP SERPL CALC-SCNC: 10 MMOL/L (ref 7–16)
AST SERPL-CCNC: 26 U/L (ref 12–45)
BILIRUB SERPL-MCNC: 0.4 MG/DL (ref 0.1–1.5)
BUN SERPL-MCNC: 16 MG/DL (ref 8–22)
CALCIUM ALBUM COR SERPL-MCNC: 8.5 MG/DL (ref 8.5–10.5)
CALCIUM SERPL-MCNC: 8.6 MG/DL (ref 8.5–10.5)
CHLORIDE SERPL-SCNC: 109 MMOL/L (ref 96–112)
CHOLEST SERPL-MCNC: 214 MG/DL (ref 100–199)
CO2 SERPL-SCNC: 21 MMOL/L (ref 20–33)
CREAT SERPL-MCNC: 1.08 MG/DL (ref 0.5–1.4)
FASTING STATUS PATIENT QL REPORTED: NORMAL
GFR SERPLBLD CREATININE-BSD FMLA CKD-EPI: 75 ML/MIN/1.73 M 2
GLOBULIN SER CALC-MCNC: 2.8 G/DL (ref 1.9–3.5)
GLUCOSE SERPL-MCNC: 99 MG/DL (ref 65–99)
HDLC SERPL-MCNC: 32 MG/DL
LDLC SERPL CALC-MCNC: 119 MG/DL
POTASSIUM SERPL-SCNC: 4.1 MMOL/L (ref 3.6–5.5)
PROT SERPL-MCNC: 6.9 G/DL (ref 6–8.2)
SODIUM SERPL-SCNC: 140 MMOL/L (ref 135–145)
T4 FREE SERPL-MCNC: 1 NG/DL (ref 0.93–1.7)
TRIGL SERPL-MCNC: 313 MG/DL (ref 0–149)
TSH SERPL-ACNC: 4.54 UIU/ML (ref 0.38–5.33)

## 2025-07-18 ENCOUNTER — APPOINTMENT (OUTPATIENT)
Dept: MEDICAL GROUP | Facility: PHYSICIAN GROUP | Age: 65
End: 2025-07-18
Payer: COMMERCIAL

## 2025-08-01 ENCOUNTER — HOSPITAL ENCOUNTER (OUTPATIENT)
Facility: MEDICAL CENTER | Age: 65
End: 2025-08-01
Attending: NURSE PRACTITIONER
Payer: COMMERCIAL

## 2025-08-01 ENCOUNTER — APPOINTMENT (OUTPATIENT)
Dept: MEDICAL GROUP | Facility: PHYSICIAN GROUP | Age: 65
End: 2025-08-01
Payer: COMMERCIAL

## 2025-08-01 DIAGNOSIS — Z79.899 HISTORY OF LONG-TERM TREATMENT WITH HIGH-RISK MEDICATION: ICD-10-CM

## 2025-08-01 PROCEDURE — 80307 DRUG TEST PRSMV CHEM ANLYZR: CPT

## 2025-08-01 ASSESSMENT — ENCOUNTER SYMPTOMS
HEADACHES: 0
DIZZINESS: 0
FEVER: 0
WEIGHT LOSS: 0
CHILLS: 0
PALPITATIONS: 0
SHORTNESS OF BREATH: 0

## 2025-08-01 ASSESSMENT — FIBROSIS 4 INDEX: FIB4 SCORE: 1.76

## 2025-08-03 LAB
AMPHET CTO UR CFM-MCNC: NEGATIVE NG/ML
BARBITURATES CTO UR CFM-MCNC: NEGATIVE NG/ML
BENZODIAZ CTO UR CFM-MCNC: NEGATIVE NG/ML
BUPRENORPHINE UR-MCNC: NEGATIVE NG/ML
CANNABINOIDS CTO UR CFM-MCNC: NEGATIVE NG/ML
CARISOPRODOL UR-MCNC: NEGATIVE NG/ML
COCAINE CTO UR CFM-MCNC: NEGATIVE NG/ML
CREAT UR-MCNC: 128.1 MG/DL (ref 20–400)
DRUG SCREEN COMMENT UR-IMP: NORMAL
ETHYL GLUCURONIDE UR QL SCN: NEGATIVE NG/ML
FENTANYL UR-MCNC: NEGATIVE NG/ML
MEPERIDINE CTO UR SCN-MCNC: NEGATIVE NG/ML
METHADONE CTO UR CFM-MCNC: NEGATIVE NG/ML
OPIATES UR QL SCN: NEGATIVE NG/ML
OXYCDOXYM URSCRN 2005102: NEGATIVE NG/ML
PCP CTO UR CFM-MCNC: NEGATIVE NG/ML
PROPOXYPH CTO UR CFM-MCNC: NEGATIVE NG/ML
TAPENTADOL UR-MCNC: NEGATIVE NG/ML
TRAMADOL CTO UR SCN-MCNC: NEGATIVE NG/ML
ZOLPIDEM UR-MCNC: NEGATIVE NG/ML